# Patient Record
Sex: MALE | Race: WHITE | Employment: FULL TIME | URBAN - METROPOLITAN AREA
[De-identification: names, ages, dates, MRNs, and addresses within clinical notes are randomized per-mention and may not be internally consistent; named-entity substitution may affect disease eponyms.]

---

## 2022-01-12 ENCOUNTER — HOSPITAL ENCOUNTER (OUTPATIENT)
Dept: PHYSICAL THERAPY | Age: 32
Discharge: HOME | End: 2022-01-12
Payer: COMMERCIAL

## 2022-01-12 PROCEDURE — 97161 PT EVAL LOW COMPLEX 20 MIN: CPT

## 2022-01-12 PROCEDURE — 97112 NEUROMUSCULAR REEDUCATION: CPT

## 2022-01-12 PROCEDURE — 97110 THERAPEUTIC EXERCISES: CPT

## 2022-01-12 PROCEDURE — 97530 THERAPEUTIC ACTIVITIES: CPT

## 2022-01-12 NOTE — PROGRESS NOTES
[facility]              [address]        [place], [address]   [phone] fax    Plan of Care/ Statement of Necessity for Physical Therapy Services  Patient name: Sabine Chanel Start of Care: 2022   Referral source: Grady Perdue,* : 1990    Medical Diagnosis: Left knee pain [M25.562]  Payor: Nahun Villafana / [place]: [place] [employer] / Product Type: PPO /  Onset Date:  DoS 2021  DoI 2021    Treatment Diagnosis: Left Knee Pain, s/p ACLr   Prior Hospitalization: see medical history Provider#: [id number]   Medications: Verified on Patient summary List    Comorbidities: Left Knee ACL Reconstruction x2 (, 2021)   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 Assessment:    Patient presents s/p left knee ACL reconstruction revision via BTB allograft, lateral meniscus repair and chondroplasty of the lateral femoral condyle (DoS 2021) secondary to sports-related injury 2021. PMH significant for prior ACL reconstruction ~. Patient demonstrates a normal neurovascular screen with appropriate healing visualized upon inspection of surgical incisions. Patient noted to demonstrate loss of terminal knee extension AROM, AROM 0-5-90 degrees, with poor recruitment of the quadriceps. Review of exercise performance and positional stretching to aid patient in post-surgical return of extension. Reviewed with patient post-surgical protocol as well as post-surgical ROM and weightbearing precautions.  To progress patient in accordance with post-surgical protocol and MD discretion to enable patient to return back to PLOF.      Patient will continue to benefit from skilled PT services to modify and progress therapeutic interventions, address functional mobility deficits, address ROM deficits, address strength deficits, analyze and address soft tissue restrictions, analyze and cue movement patterns, analyze and modify body mechanics/ergonomics, assess and modify postural abnormalities, address imbalance/dizziness and instruct in home and community integration to attain remaining goals. Key Information:    Gait:    NWB with bilateral axillary crutches  Functional Tests: Deferred secondary to weightbearing precautions     Surgical Incision: Taping in place; No evidence of drainage without evidence of infection  Neurovascular Screen: Intact gross sensation to light touch, 2+ Posterior tibialis pulse, 2+ Dorsalis pedis pulse, Non-tender, supple calf upon palpation     ROM / Strength []?  Unable to assess                                                                                                                                                                          AROM                     MMT (1-5)      Left Right Left Right   Knee Flexion 90 156 5 5     Extension (0)5 3(0) 2+ 5   *Assessed in supine     Poor/fair quad contraction with supine quad set     Palpation:              Warmth: Slight increase in warmth to knee              Edema: 1+ pitting edema to tibial tuberosity, Non-pitting edema to anterior knee     Girth Measurements:     Joint line   Left 40 cm   Right  37 cm      Evaluation Complexity History MEDIUM  Complexity : 1-2 comorbidities / personal factors will impact the outcome/ POC ; Examination MEDIUM Complexity : 3 Standardized tests and measures addressing body structure, function, activity limitation and / or participation in recreation  ;Presentation LOW Complexity : Stable, uncomplicated  ;Clinical Decision Making HIGH Complexity : FOTO score of 1- 25   Overall Complexity Rating: LOW   Problem List: pain affecting function, decrease ROM, decrease strength, edema affecting function, impaired gait/ balance, decrease ADL/ functional abilitiies, decrease activity tolerance, decrease flexibility/ joint mobility and decrease transfer abilities   Treatment Plan may include any combination of the following: Therapeutic exercise, Therapeutic activities, Neuromuscular re-education, Physical agent/modality, Gait/balance training, Manual therapy, Patient education, Self Care training, Functional mobility training, Home safety training and Stair training  Patient / Family readiness to learn indicated by: asking questions, trying to perform skills and interest  Persons(s) to be included in education: patient (P)  Barriers to Learning/Limitations: None  Patient Goal (s): Full range of motion, Weightbearing and walking, Regain confidence in knee  Patient Self Reported Health Status: excellent  Rehabilitation Potential: good    Short Term Goals: To be accomplished in 3 weeks:  1. Patient will subjectively report full compliance with prescribed HEP. Eval: HEP provided  2. Patient will demonstrate left knee AROM 0-90 degrees to enable patient to progress successfully with post-surgical protocol. Eval: Left Knee AROM = 0 - 5 - 90 degrees  3. Patient will demonstrate left supine SLR x15 repetitions with maintenance of terminal knee extension without quadriceps lag (no brace) to allow for successful transition to WBAT when released. Eval: Supine Quad Set = Fair quad contraction    Long Term Goals: To be accomplished in 6 weeks:  1. Patient will demonstrate a significant functional improvement as demonstrated by a score of >/= 65 on FOTO. Eval: FOTO = 24       2. Patient will demonstrate left SLS >/= 30 seconds to improve safety with community ambulation. Eval: NT per weightbearing precautions  3. Patient will demonstrate ability to perform sit-to-stand from chair with symmetrical weightbearing bilaterally and without pain to improve ease with functional transfers. Eval: NT per weightbearing precautions     Frequency / Duration: Patient to be seen 2 times per week for 6 weeks.     Patient/ Caregiver education and instruction: Diagnosis, prognosis, self care, activity modification, brace/ splint application and exercises   [x]  Plan of care has been reviewed with CLAUDINE Prieto, PT 1/12/2022 8:37 AM  ________________________________________________________________________    I certify that the above Therapy Services are being furnished while the patient is under my care. I agree with the treatment plan and certify that this therapy is necessary.     Physician's Signature:____________Date:_________TIME:________     Chelsy Fernando  ** Signature, Date and Time must be completed for valid certification **  Please sign and return to [facility] [address]              [address] [facility]        [place], [address]   [phone] fax

## 2022-01-12 NOTE — PROGRESS NOTES
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, [employer], Recreational Sports, Work [employer]  Current Functional Status: NWB with use of bilateral crutches  Work Hx: [address] (currently working from home)  Living Situation: Lives in [facility] (bedroom on second story - currently sleeping on couch)  Comorbidities: Left Knee ACL Reconstruction x2 (, 2021)  Medications: Tylenol, Oxycodone    Subjective: Patient presents s/p revision of left ACL reconstruction with one-third mid-patellar bone-tendon-bone allograft with concomitant completion of lateral meniscus tear repair and chrondroplasty of the lateral femoral condyle (DoS 2021). Surgery secondary to patient undergoing injury 2021 while playing [email or web address]. Patient with history of initial left ACL reconstruction in . Per last MD note 1/3/2022 patient to be touch-down weightbearing for 4 weeks post-surgically with ROM 0-90 degrees for 6 weeks post-surgery. Patient to be progressed to full weightbearing and weaned off of crutches 6-8 weeks post-surgery. Patient presents with hinge brace unlocked 0-90 degrees. Patient reports working on gentle quad strengthening (I.e. quad set, SLR). Patient reports maintenance of weightbearing precautions. Patient denies falls. Patient reports deep anterior knee pain.  Patient reports noting edema to the anterior superior lower leg but denies pain which radiates into the thigh nor lower leg. Patient reports isolated N/T to lateral knee. Patient denies the following: s/s infection, s/s DVT/PE. Pain Intensity (0-10, VAS): Current 4, Worst 10, Best 3    Patient Goals: \"Full range of motion, Weightbearing and walking, Regain confidence in knee\"     OBJECTIVE EXAMINATION    Gait: NWB with bilateral axillary crutches    Functional Tests: Deferred secondary to weightbearing precautions    Surgical Incision: Taping in place;  No evidence of drainage without evidence of infection  Neurovascular Screen: Intact gross sensation to light touch, 2+ Posterior tibialis pulse, 2+ Dorsalis pedis pulse, Non-tender, supple calf upon palpation    ROM / Strength [] Unable to assess                                                                               AROM                     MMT (1-5)    Left Right Left Right   Hip Flexion   5 5    ER   NT 5    IR   NT 5   Knee Flexion 90 156 5 5    Extension (0)5 3(0) 2+ 5   Ankle Dorsiflexion   5 5   *Assessed in supine    Fair quad contraction with supine quad set    Flexibility: [] Unable to assess at this time  Hamstrings (Supine 90-90  Inclinometer):    (L) Tightness=  Moderate    Palpation:   Warmth: Slight increase in warmth to knee   Edema: 1+ pitting edema to tibial tuberosity, Non-pitting edema to anterior knee       Girth Measurements:    Joint line   Left 40 cm   Right  37 cm       OBJECTIVE    19 min [x]Eval                  []Re-Eval     8 min Therapeutic Exercise:  [x] See flow sheet : Patient educated regarding completion of prescribed HEP and provided with written HEP instructions, Patient educated regarding diagnosis and PT POC   Rationale: increase ROM and increase strength to improve the patients ability to improve ease with functional ADLs    10 min Therapeutic Activity:   See flow sheet : Review regarding donning/doffing of brace and proper positioning, Review regarding fall prevention, Education regarding post-operative protocol and ROM and WB precautions, Education regarding infection/DVT/PE prevention   Rationale: increase ROM, increase strength and increase proprioception  to improve the patients ability to improve post-surgical results     8 min Neuromuscular Re-education:  [x]  See flow sheet : Emphasis placed on improving recruitment and activation of the quadriceps with utilization of visual and tactile cuing - Performance in supine and supine longsit   Rationale: increase ROM, increase strength, improve balance and increase proprioception  to improve the patients ability to improve ease with return to PLOF          With   [] TE   [] TA   [] neuro   [] other: Patient Education: [x] Review HEP    [] Progressed/Changed HEP based on:   [] positioning   [] body mechanics   [] transfers   [] heat/ice application    [] other:      Other Objective/Functional Measures: See objective above. Pain Level (0-10 scale) post treatment: 4    ASSESSMENT/Changes in Function: Patient presents s/p left knee ACL reconstruction revision via BTB allograft, lateral meniscus repair and chondroplasty of the lateral femoral condyle (DoS 12/29/2021) secondary to sports-related injury 11/25/2021. PMH significant for prior ACL reconstruction ~2005. Patient demonstrates a normal neurovascular screen with appropriate healing visualized upon inspection of surgical incisions. Patient noted to demonstrate loss of terminal knee extension AROM, AROM 0-5-90 degrees, with poor recruitment of the quadriceps. Review of exercise performance and positional stretching to aid patient in post-surgical return of extension. Reviewed with patient post-surgical protocol as well as post-surgical ROM and weightbearing precautions. To progress patient in accordance with post-surgical protocol and MD discretion to enable patient to return back to PLOF.      Patient will continue to benefit from skilled PT services to modify and progress therapeutic interventions, address functional mobility deficits, address ROM deficits, address strength deficits, analyze and address soft tissue restrictions, analyze and cue movement patterns, analyze and modify body mechanics/ergonomics, assess and modify postural abnormalities, address imbalance/dizziness and instruct in home and community integration to attain remaining goals. [x]  See Plan of Care  []  See progress note/recertification  []  See Discharge Summary         Progress towards goals / Updated goals:    Short Term Goals: To be accomplished in 3 weeks:  1. Patient will subjectively report full compliance with prescribed HEP. Eval: HEP provided  2. Patient will demonstrate left knee AROM 0-90 degrees to enable patient to progress successfully with post-surgical protocol. Eval: Left Knee AROM = 0 - 5 - 90 degrees  3. Patient will demonstrate left supine SLR x15 repetitions with maintenance of terminal knee extension without quadriceps lag (no brace) to allow for successful transition to WBAT when released. Eval: Supine Quad Set = Fair quad contraction    Long Term Goals: To be accomplished in 6 weeks:  1. Patient will demonstrate a significant functional improvement as demonstrated by a score of >/= 65 on FOTO. Eval: FOTO = 24       2. Patient will demonstrate left SLS >/= 30 seconds to improve safety with community ambulation. Eval: NT per weightbearing precautions  3. Patient will demonstrate ability to perform sit-to-stand from chair with symmetrical weightbearing bilaterally and without pain to improve ease with functional transfers.   Eval: NT per weightbearing precautions       PLAN  [x]  Upgrade activities as tolerated     []  Continue plan of care  []  Update interventions per flow sheet       []  Discharge due to:_  []  Other:_      Jessika Owens PT 1/12/2022  8:32 AM    Future Appointments   Date Time Provider Sofía Rodriguez   1/12/2022  2:45 PM Kimberly Baca, PT [facility]

## 2022-01-17 ENCOUNTER — HOSPITAL ENCOUNTER (OUTPATIENT)
Dept: PHYSICAL THERAPY | Age: 32
Discharge: HOME | End: 2022-01-17
Payer: COMMERCIAL

## 2022-01-17 PROCEDURE — 97112 NEUROMUSCULAR REEDUCATION: CPT

## 2022-01-17 PROCEDURE — 97014 ELECTRIC STIMULATION THERAPY: CPT

## 2022-01-17 PROCEDURE — 97110 THERAPEUTIC EXERCISES: CPT

## 2022-01-17 PROCEDURE — 97140 MANUAL THERAPY 1/> REGIONS: CPT

## 2022-01-17 NOTE — PROGRESS NOTES
PT DAILY TREATMENT NOTE     Patient Name: Nithya Archuleta  Date:2022  : 1990  [x]  Patient  Verified  Payor: Wallace Araiza / [place]: [place] [employer] PPO / Product Type: PPO /    In time:9:31  Out time:10:23  Total Treatment Time (min): 52  Visit #: 2 of 12      Treatment Area: Left knee pain [M25.562]    SUBJECTIVE  Pain Level (0-10 scale): 2  Any medication changes, allergies to medications, adverse drug reactions, diagnosis change, or new procedure performed?: [x] No    [] Yes (see summary sheet for update)  Subjective functional status/changes:   [] No changes reported  Patient states he feels his knee is getting better every day. OBJECTIVE    Modality rationale: decrease pain to improve the patients ability to decrease difficulty while performing tasks. Min Type Additional Details   10 [x] Estim:  []Unatt       []IFC  []Premod                        [x]Other: VMS 10:10                               (with quad set) [x]w/ice   []w/heat  Position:supine  Location:quads/knee   [] Skin assessment post-treatment:  []intact []redness- no adverse reaction    []redness  adverse reaction:       20 min Therapeutic Exercise:  [x] See flow sheet :   Rationale: increase ROM, increase strength, improve coordination and increase proprioception to improve the patients ability to increase ease with ADLs. 10 min Neuromuscular Re-education:  [x]  See flow sheet :emaphsis on quad set. Rationale: increase ROM, increase strength, improve coordination and increase proprioception  to improve the patients ability to increase ease with ambulation. 12 min Manual Therapy:    Supine- patellar glide (superior/lateral/inferior)  Supine- manual hamstring stretch. The manual therapy interventions were performed at a separate and distinct time from the therapeutic activities interventions.   Rationale: decrease pain, increase ROM, increase tissue extensibility and decrease trigger points to increase ease with transfers. With   [] TE   [] TA   [] neuro   [] other: Patient Education: [x] Review HEP    [] Progressed/Changed HEP based on:   [] positioning   [] body mechanics   [] transfers   [] heat/ice application    [] other:      Other Objective/Functional Measures: pt reports performing HEP. Left knee AROM = 0-5-90 deg    Pain Level (0-10 scale) post treatment: 5    ASSESSMENT/Changes in Function: Initiated exercises per POC. Patient reports being compliant with weightbearing precautions. Patient will continue to benefit from skilled PT services to modify and progress therapeutic interventions, address functional mobility deficits, address ROM deficits, address strength deficits and analyze and address soft tissue restrictions to attain remaining goals. []  See Plan of Care  []  See progress note/recertification  []  See Discharge Summary         Progress towards goals / Updated goals:  Short Term Goals: To be accomplished in 3 weeks:  1. Patient will subjectively report full compliance with prescribed HEP. Eval: HEP provided  Current: MET: pt reports performing HEP. 1/17/22  2. Patient will demonstrate left knee AROM 0-90 degrees to enable patient to progress successfully with post-surgical protocol. Eval: Left Knee AROM = 0 - 5 - 90 degrees  Current: Remains: Left knee AROM = 0-5-90 deg. 1/17/22  3. Patient will demonstrate left supine SLR x15 repetitions with maintenance of terminal knee extension without quadriceps lag (no brace) to allow for successful transition to WBAT when released. Eval: Supine Quad Set = Fair quad contraction     Long Term Goals: To be accomplished in 6 weeks:  1. Patient will demonstrate a significant functional improvement as demonstrated by a score of >/= 65 on FOTO. Eval: FOTO = 24       2. Patient will demonstrate left SLS >/= 30 seconds to improve safety with community ambulation. Eval: NT per weightbearing precautions  3.  Patient will demonstrate ability to perform sit-to-stand from chair with symmetrical weightbearing bilaterally and without pain to improve ease with functional transfers.   Eval: NT per weightbearing precautions        PLAN  []  Upgrade activities as tolerated     [x]  Continue plan of care  []  Update interventions per flow sheet       []  Discharge due to:_  []  Other:_      aCssius Lambert, PTA 1/17/2022  9:32 AM    Future Appointments   Date Time Provider Sofía Rodriguez   1/21/2022  1:15 PM Albina Guillen [facility]   1/24/2022  1:15 PM Alondra Peña [facility]   1/28/2022  1[facility]   2/1/2022 11:45 AM Alondra Peña, PT [facility]   2/3/2022 11:00 AM Renato Millan [facility]   2/8/2022 11:45 AM Alondra Peña, PT [facility]   2/10/2022 11:00 AM Bikkers, Randee Duverney, PT [facility]

## 2022-01-24 ENCOUNTER — HOSPITAL ENCOUNTER (OUTPATIENT)
Dept: PHYSICAL THERAPY | Age: 32
Discharge: HOME | End: 2022-01-24
Payer: COMMERCIAL

## 2022-01-24 PROCEDURE — 97112 NEUROMUSCULAR REEDUCATION: CPT

## 2022-01-24 PROCEDURE — 97140 MANUAL THERAPY 1/> REGIONS: CPT

## 2022-01-24 PROCEDURE — 97110 THERAPEUTIC EXERCISES: CPT

## 2022-01-24 NOTE — PROGRESS NOTES
PT DAILY TREATMENT NOTE     Patient Name: Jorge Zapata  Date:2022  : 1990  [x]  Patient  Verified  Payor: Dejon Ortez / [place]: [place] [employer] [place] / Product Type: PPO /    In time:117  Out time:158  Total Treatment Time (min): 41  Visit #: 3 of 12    Treatment Area: Left knee pain [M25.562]    SUBJECTIVE  Pain Level (0-10 scale): 0  Any medication changes, allergies to medications, adverse drug reactions, diagnosis change, or new procedure performed?: [x] No    [] Yes (see summary sheet for update)  Subjective functional status/changes:   [] No changes reported  Patient reports noting some discomfort below his knee cap when extending his knee. Patient reports removal of stitches last week without noting any s/s of infection. OBJECTIVE    21 min Therapeutic Exercise:  [x] See flow sheet : Emphasis placed on improving available left knee AROM and LE strength   Rationale: increase ROM and increase strength to improve the patients ability to improve ease with household ADLs    10 min Neuromuscular Re-education:  [x]  See flow sheet : Emphasis placed on improving activation and recruitment of the quadriceps and gluteal musculature and improving LE proprioceptive and kinesthetic awareness   Rationale: increase ROM, increase strength, improve balance and increase proprioception  to improve the patients ability to improve ease with transition to ambulation    10 min Manual Therapy:    Supine, Left Tibial Distraction (OPP)  Supine, Left Hamstring 90/90 Manual Stretch  Supine, Left Knee Extension with Manual Resistance   The manual therapy interventions were performed at a separate and distinct time from the therapeutic activities interventions. Rationale: decrease pain, increase ROM and increase tissue extensibility to improve ease with stair management.      With   [] TE   [] TA   [] neuro   [] other: Patient Education: [x] Review HEP    [] Progressed/Changed HEP based on:   [] positioning   [] body mechanics [] transfers   [] heat/ice application    [] other:      Other Objective/Functional Measures:   Left Knee AROM = 0 - 3 - 90 deg (pre-manual)     *EStim not completed per patient request secondary to lack of appropriate clothing. Pain Level (0-10 scale) post treatment: 0    ASSESSMENT/Changes in Function: With ability to achieve terminal knee extension AROM post-manual but with continued prominent hypertonicity of the hamstring musculature with therapist questioning presence of development of patellar tendinopathy with patient educated to ice region to aid in pain and swelling management. With plan to initiate partial weightbearing with use of bilateral crutches per MD discretion. Patient will continue to benefit from skilled PT services to modify and progress therapeutic interventions, address functional mobility deficits, address ROM deficits, address strength deficits, analyze and address soft tissue restrictions, analyze and cue movement patterns, analyze and modify body mechanics/ergonomics, assess and modify postural abnormalities, address imbalance/dizziness and instruct in home and community integration to attain remaining goals. []  See Plan of Care  []  See progress note/recertification  []  See Discharge Summary         Progress towards goals / Updated goals:    Short Term Goals: To be accomplished in 3 weeks:  1. Patient will subjectively report full compliance with prescribed HEP. Eval: HEP provided  Current: Met, Patient reports performing HEP. 1/17/22  2. Patient will demonstrate left knee AROM 0-90 degrees to enable patient to progress successfully with post-surgical protocol. Eval: Left Knee AROM = 0 - 5 - 90 degrees  Current: Remains, Left Knee AROM = 0 - 3 - 90 deg, 1/24/2022  3. Patient will demonstrate left supine SLR x15 repetitions with maintenance of terminal knee extension without quadriceps lag (no brace) to allow for successful transition to WBAT when released.   Eval: Supine Quad Set = Fair quad contraction     Long Term Goals: To be accomplished in 6 weeks:  1. Patient will demonstrate a significant functional improvement as demonstrated by a score of >/= 65 on FOTO. Eval: FOTO = 24       2. Patient will demonstrate left SLS >/= 30 seconds to improve safety with community ambulation. Eval: NT per weightbearing precautions  3. Patient will demonstrate ability to perform sit-to-stand from chair with symmetrical weightbearing bilaterally and without pain to improve ease with functional transfers.   Eval: NT per weightbearing precautions     PLAN  [x]  Upgrade activities as tolerated     [x]  Continue plan of care  []  Update interventions per flow sheet       []  Discharge due to:_  []  Other:_      Dash Treviño PT 1/24/2022  10:28 AM    Future Appointments   Date Time Provider Sofía Rodriguez   1/24/2022  1:15 PM Bonifacio Mccormick [place] [facility]   1/28/2022  1:15 PM [facility] [place] [facility]   2/1/2022 11:45 AM Bonifacio Mccormick PT [facility]   [facility]2022 [facility] [place] [facility]   2/8/2022 11:45 AM Bonifacio Mccormick PT [facility]   2/10/2022 11:00 AM Nanette Guillen PT [facility]

## 2022-01-28 ENCOUNTER — HOSPITAL ENCOUNTER (OUTPATIENT)
Dept: PHYSICAL THERAPY | Age: 32
Discharge: HOME | End: 2022-01-28
Payer: COMMERCIAL

## 2022-01-28 PROCEDURE — 97140 MANUAL THERAPY 1/> REGIONS: CPT

## 2022-01-28 PROCEDURE — 97014 ELECTRIC STIMULATION THERAPY: CPT

## 2022-01-28 PROCEDURE — 97112 NEUROMUSCULAR REEDUCATION: CPT

## 2022-01-28 PROCEDURE — 97110 THERAPEUTIC EXERCISES: CPT

## 2022-01-28 NOTE — PROGRESS NOTES
PT DAILY TREATMENT NOTE     Patient Name: Francisco Schneider  Date:2022  : 1990  [x]  Patient  Verified  Payor: Clemente Atkins / [place]: [place] [employer] [place] / Product Type: PPO /    In time:117  Out time:214  Total Treatment Time (min): 62  Visit #: 4 of 12    Treatment Area: Left knee pain [M25.562]    SUBJECTIVE  Pain Level (0-10 scale): 0  Any medication changes, allergies to medications, adverse drug reactions, diagnosis change, or new procedure performed?: [x] No    [] Yes (see summary sheet for update)  Subjective functional status/changes:   [] No changes reported  Patient reports continued non-weightbearing through left LE. Patient reports noting some AM discomfort upon waking. Patient reports relief of swelling with use of compression garment to left knee. OBJECTIVE    Modality rationale: decrease pain and increase muscle contraction/control to improve the patients ability to improve ease with return to ambulation   Min Type Additional Details   10 X Estim:  -Unatt       -IFC  -Premod                        -Other: VMS 4 pads co-contract Position: Supine  Location: Quadriceps     19 min Therapeutic Exercise:  [x]? See flow sheet : Emphasis placed on improving available left knee AROM and LE strength   Rationale: increase ROM and increase strength to improve the patients ability to improve ease with household ADLs     20 min Neuromuscular Re-education:  [x]?   See flow sheet : Emphasis placed on improving activation and recruitment of the quadriceps and gluteal musculature and improving LE proprioceptive and kinesthetic awareness   Rationale: increase ROM, increase strength, improve balance and increase proprioception  to improve the patients ability to improve ease with transition to ambulation     8 min Manual Therapy:    Supine, Left Tibial Distraction (OPP)  Supine, Left Hamstring 90/90 Manual Stretch   The manual therapy interventions were performed at a separate and distinct time from the therapeutic activities interventions. Rationale: decrease pain, increase ROM and increase tissue extensibility to improve ease with stair management. With   [] TE   [] TA   [] neuro   [] other: Patient Education: [x] Review HEP    [] Progressed/Changed HEP based on:   [] positioning   [] body mechanics   [] transfers   [] heat/ice application    [] other:      Other Objective/Functional Measures:   Left Knee AROM = 0 - 90 deg     Pain Level (0-10 scale) post treatment: 0    ASSESSMENT/Changes in Function: In accordance with MD discretion initiated 25% weightbearing through left LE with continued use of bilateral axillary crutches. Patient with ability to achieve terminal knee extension pre-manual with patient with ability to perform supine SLR, without brace, x10 repetitions without quadriceps lag. Patient will continue to benefit from skilled PT services to modify and progress therapeutic interventions, address functional mobility deficits, address ROM deficits, address strength deficits, analyze and address soft tissue restrictions, analyze and cue movement patterns, analyze and modify body mechanics/ergonomics, assess and modify postural abnormalities, address imbalance/dizziness and instruct in home and community integration to attain remaining goals. []  See Plan of Care  []  See progress note/recertification  []  See Discharge Summary         Progress towards goals / Updated goals:    Short Term Goals: To be accomplished in 3 weeks:  1. Patient will subjectively report full compliance with prescribed HEP. Eval: HEP provided  Current: Met, Patient reports performing HEP. 1/17/22  2. Patient will demonstrate left knee AROM 0-90 degrees to enable patient to progress successfully with post-surgical protocol. Eval: Left Knee AROM = 0 - 5 - 90 degrees  Current: Progressing, Left Knee AROM = 0 - 90 deg, 1/28/2022  3.  Patient will demonstrate left supine SLR x15 repetitions with maintenance of terminal knee extension without quadriceps lag (no brace) to allow for successful transition to WBAT when released. Eval: Supine Quad Set = Fair quad contraction     Long Term Goals: To be accomplished in 6 weeks:  1. Patient will demonstrate a significant functional improvement as demonstrated by a score of >/= 65 on FOTO. Eval: FOTO = 24       2. Patient will demonstrate left SLS >/= 30 seconds to improve safety with community ambulation. Eval: NT per weightbearing precautions  3. Patient will demonstrate ability to perform sit-to-stand from chair with symmetrical weightbearing bilaterally and without pain to improve ease with functional transfers.   Eval: NT per weightbearing precautions     PLAN  [x]  Upgrade activities as tolerated     [x]  Continue plan of care  []  Update interventions per flow sheet       []  Discharge due to:_  []  Other:_      Mirella York, PT 1/28/2022  7:43 AM    Future Appointments   Date Time Provider Sofía Rodriguez   1/28/2022  1:15 PM Renato Kent [facility]   2/1/2022 11:45 AM Burgess Steiner, PT [facility]   2/3/2022 11:00 AM Renato Kent [facility]   2/8/2022 11:45 AM Burgess Steiner, PT [facility]   2/10/2022 11:00 AM Zaida Guillen, PT [facility]

## 2022-02-01 ENCOUNTER — APPOINTMENT (OUTPATIENT)
Dept: PHYSICAL THERAPY | Age: 32
End: 2022-02-01
Payer: COMMERCIAL

## 2022-02-02 ENCOUNTER — HOSPITAL ENCOUNTER (OUTPATIENT)
Dept: PHYSICAL THERAPY | Age: 32
Discharge: HOME | End: 2022-02-02
Payer: COMMERCIAL

## 2022-02-02 ENCOUNTER — TELEPHONE (OUTPATIENT)
Dept: PHYSICAL THERAPY | Age: 32
End: 2022-02-02

## 2022-02-02 PROCEDURE — 97140 MANUAL THERAPY 1/> REGIONS: CPT

## 2022-02-02 PROCEDURE — 97110 THERAPEUTIC EXERCISES: CPT

## 2022-02-02 PROCEDURE — 97112 NEUROMUSCULAR REEDUCATION: CPT

## 2022-02-02 NOTE — PROGRESS NOTES
PT DAILY TREATMENT NOTE     Patient Name: Christelle Pineda  Date:2022  : 1990  [x]  Patient  Verified  Payor: Radha Clayton / [place]: [place] [employer] [place] / Product Type: PPO /    In time:3:31  Out time:4:32  Total Treatment Time (min): 61  Visit #: 5 of 12      Treatment Area: Left knee pain [M25.562]    SUBJECTIVE  Pain Level (0-10 scale): 2  Any medication changes, allergies to medications, adverse drug reactions, diagnosis change, or new procedure performed?: [x] No    [] Yes (see summary sheet for update)  Subjective functional status/changes:   [] No changes reported  Patient reports his knee has been a little sore today. Patient states that the physician stated his knee was healing well. OBJECTIVE                 Modality rationale: decrease pain to improve the patients ability to decrease difficulty while performing tasks. Min Type Additional Details    10 [x]? Estim:  []? Unatt       []? IFC  []? Premod                        [x]? Other: VMS 10\" on, 10\" off with SLR []?w/ice   []?w/heat  Position:supine  Location:quad (VMO)    10 [x]? Ice     []?  heat  []? Ice massage  []? Laser   []? Anodyne Position:sitting  Location:knee    []? Skin assessment post-treatment:  []?intact []? redness- no adverse reaction    []? redness  adverse reaction:        23 min Therapeutic Exercise:  [x]? See flow sheet :   Rationale: increase ROM, increase strength, improve coordination and increase proprioception to improve the patients ability to increase ease with ADLs.       10 min Neuromuscular Re-education:  [x]? See flow sheet :emaphsis on quad set. Rationale: increase ROM, increase strength, improve coordination and increase proprioception  to improve the patients ability to increase ease with ambulation.      8 min Manual Therapy:    Supine- patellar glide (superior/lateral/inferior)  Supine- manual hamstring stretch.     The manual therapy interventions were performed at a separate and distinct time from the therapeutic activities interventions. Rationale: decrease pain, increase ROM, increase tissue extensibility and decrease trigger points to increase ease with transfers.            With   [] TE   [] TA   [] neuro   [] other: Patient Education: [x] Review HEP    [] Progressed/Changed HEP based on:   [] positioning   [] body mechanics   [] transfers   [] heat/ice application    [] other:      Other Objective/Functional Measures: pre manual left knee 0-90 deg. Pain Level (0-10 scale) post treatment: 2    ASSESSMENT/Changes in Function: Patient is progressing well with good quad set. Patient continues to maintain terminal knee extension. Patient will continue to benefit from skilled PT services to modify and progress therapeutic interventions, address functional mobility deficits, address ROM deficits, address strength deficits, analyze and address soft tissue restrictions and analyze and cue movement patterns to attain remaining goals. []  See Plan of Care  []  See progress note/recertification  []  See Discharge Summary         Progress towards goals / Updated goals:  Short Term Goals: To be accomplished in 3 weeks:  1. Patient will subjectively report full compliance with prescribed HEP. Eval: HEP provided  Current: Met, Patient reports performing HEP. 1/17/22  2. Patient will demonstrate left knee AROM 0-90 degrees to enable patient to progress successfully with post-surgical protocol. Eval: Left Knee AROM = 0 - 5 - 90 degrees  Current: Progressing, Left Knee AROM = 0 - 90 deg, 1/28/2022  3. Patient will demonstrate left supine SLR x15 repetitions with maintenance of terminal knee extension without quadriceps lag (no brace) to allow for successful transition to WBAT when released. Eval: Supine Quad Set = Fair quad contraction  Current: Progressing: SLR = 10 reps with minimal extensor lag. 2/2/22     Long Term Goals: To be accomplished in 6 weeks:  1.  Patient will demonstrate a significant functional improvement as demonstrated by a score of >/= 65 on FOTO. Eval: FOTO = 24       2. Patient will demonstrate left SLS >/= 30 seconds to improve safety with community ambulation. Eval: NT per weightbearing precautions  3. Patient will demonstrate ability to perform sit-to-stand from chair with symmetrical weightbearing bilaterally and without pain to improve ease with functional transfers.   Eval: NT per weightbearing precautions     PLAN  []  Upgrade activities as tolerated     [x]  Continue plan of care  []  Update interventions per flow sheet       []  Discharge due to:_  []  Other:_      Kobi North PTA 2/2/2022  3:25 PM    Future Appointments   Date Time Provider Sofía Rodriguez   2/2/2022  3:30 PM Fredrick Black PTA [facility]   2/4/2022  1:15 PM Fredrick Black PTA [facility]   2/8/2022 11:45 AM Alyse Espinosa, PT [facility]   2/10/2022 11:00 AM Jerardo Guillen, PT [facility]

## 2022-02-03 ENCOUNTER — APPOINTMENT (OUTPATIENT)
Dept: PHYSICAL THERAPY | Age: 32
End: 2022-02-03
Payer: COMMERCIAL

## 2022-02-04 ENCOUNTER — HOSPITAL ENCOUNTER (OUTPATIENT)
Dept: PHYSICAL THERAPY | Age: 32
Discharge: HOME | End: 2022-02-04
Payer: COMMERCIAL

## 2022-02-04 PROCEDURE — 97140 MANUAL THERAPY 1/> REGIONS: CPT

## 2022-02-04 PROCEDURE — 97110 THERAPEUTIC EXERCISES: CPT

## 2022-02-04 PROCEDURE — 97112 NEUROMUSCULAR REEDUCATION: CPT

## 2022-02-04 PROCEDURE — 97014 ELECTRIC STIMULATION THERAPY: CPT

## 2022-02-04 NOTE — PROGRESS NOTES
PT DAILY TREATMENT NOTE     Patient Name: Aimee Gordon  PQDJ:[id number]  : 1990  [x]  Patient  Verified  Payor: Kendrick Díaz / Plan: [place] [employer] [place] / Product Type: PPO /    In time:1:15  Out time:2:15  Total Treatment Time (min): 60  Visit #: 6 of 12      Treatment Area: Left knee pain [M25.562]    SUBJECTIVE  Pain Level (0-10 scale): 0  Any medication changes, allergies to medications, adverse drug reactions, diagnosis change, or new procedure performed?: [x] No    [] Yes (see summary sheet for update)  Subjective functional status/changes:   [] No changes reported  Patient reports he has been practicing putting 25% weight through his leg more often to prepare for full weightbearing next week. OBJECTIVE                Modality rationale: decrease pain to improve the patients ability to decrease difficulty while performing tasks.    Min Type Additional Details     10 [x]? ? Estim:  []?? Unatt       []? ?IFC  []? ?Premod                        [x]? ? Other: VMS 10\" on, 10\" off with SLR []??w/ice   []? ?w/heat  Position:supine  Location:quad (VMO)     10 [x]? ?  Ice     []? ?  heat  []? ?  Ice massage  []? ?  Laser   []? ?  Anodyne Position:sitting  Location:knee     []? ? Skin assessment post-treatment:  []??intact []? ?redness- no adverse reaction    []? ?redness  adverse reaction:         23 min Therapeutic Exercise:  [x]? ? See flow sheet :   Rationale: increase ROM, increase strength, improve coordination and increase proprioception to improve the patients ability to increase ease with ADLs.       9 min Neuromuscular Re-education:  [x]? ?  See flow sheet :emaphsis on quad set.    Rationale: increase ROM, increase strength, improve coordination and increase proprioception  to improve the patients ability to increase ease with ambulation.      8 min Manual Therapy:    Supine- patellar glide (superior/lateral/inferior)  Supine- manual hamstring stretch.    The manual therapy interventions were performed at a separate and distinct time from the therapeutic activities interventions. Rationale: decrease pain, increase ROM, increase tissue extensibility and decrease trigger points to increase ease with transfers.                  With   [] TE   [] TA   [] neuro   [] other: Patient Education: [x] Review HEP    [] Progressed/Changed HEP based on:   [] positioning   [] body mechanics   [] transfers   [] heat/ice application    [] other:      Other Objective/Functional Measures: pre manual 0-90 deg. Pain Level (0-10 scale) post treatment: 0    ASSESSMENT/Changes in Function: Patient able to maintain terminal knee extension. Patient with less patellar tendon irritation with SAQ. Patient will continue to benefit from skilled PT services to modify and progress therapeutic interventions, address functional mobility deficits, address ROM deficits, address strength deficits, analyze and address soft tissue restrictions, analyze and cue movement patterns and analyze and modify body mechanics/ergonomics to attain remaining goals. []  See Plan of Care  []  See progress note/recertification  []  See Discharge Summary         Progress towards goals / Updated goals:  Short Term Goals: To be accomplished in 3 weeks:  1. Patient will subjectively report full compliance with prescribed HEP. Eval: HEP provided  Current: Met, Patient reports performing HEP. 1/17/22  2. Patient will demonstrate left knee AROM 0-90 degrees to enable patient to progress successfully with post-surgical protocol. Eval: Left Knee AROM = 0 - 5 - 90 degrees  Current: Progressing, Left Knee AROM = 0 - 90 deg, 1/28/2022  3. Patient will demonstrate left supine SLR x15 repetitions with maintenance of terminal knee extension without quadriceps lag (no brace) to allow for successful transition to WBAT when released. Eval: Supine Quad Set = Fair quad contraction  Current: Progressing: SLR = 10 reps with minimal extensor lag.  2/2/22     Long Term Goals: To be accomplished in 6 weeks:  1. Patient will demonstrate a significant functional improvement as demonstrated by a score of >/= 65 on FOTO. Eval: FOTO = 24       2. Patient will demonstrate left SLS >/= 30 seconds to improve safety with community ambulation. Eval: NT per weightbearing precautions  3. Patient will demonstrate ability to perform sit-to-stand from chair with symmetrical weightbearing bilaterally and without pain to improve ease with functional transfers.   Eval: NT per weightbearing precautions     PLAN  []  Upgrade activities as tolerated     [x]  Continue plan of care  []  Update interventions per flow sheet       []  Discharge due to:_  []  Other:_      Heath Yoo, CLAUDINE 2/4/2022  9:57 AM    Future Appointments   Date Time Provider Sofía Rodriguez   2/4/2022  1:15 PM Keaton Stephenson, PTA [facility]   2/8/2022 11:45 AM Maximiliano Nettles, PT [facility]   2/10/2022 11:00 AM Renato Montanez [facility]   2/11/2022  1:15 PM Keaton Stephenson, CLAUDINE [facility]   2/14/2022  2:45 PM Costjimmie Nettles, PT [facility]   2/21/2022  2:00 PM Reginald Acevedo [facility]   2/28/2022  1:15 PM Costjimmie Nettles, PT [facility]   3/7/2022  1:15 PM Costella Gather, PT [facility]   3/14/2022  1:15 PM Octavia Guillen, PT [facility]

## 2022-02-08 ENCOUNTER — HOSPITAL ENCOUNTER (OUTPATIENT)
Dept: PHYSICAL THERAPY | Age: 32
Discharge: HOME | End: 2022-02-08
Payer: COMMERCIAL

## 2022-02-08 PROCEDURE — 97140 MANUAL THERAPY 1/> REGIONS: CPT

## 2022-02-08 PROCEDURE — 97112 NEUROMUSCULAR REEDUCATION: CPT

## 2022-02-08 PROCEDURE — 97110 THERAPEUTIC EXERCISES: CPT

## 2022-02-08 PROCEDURE — 97116 GAIT TRAINING THERAPY: CPT

## 2022-02-08 PROCEDURE — 97014 ELECTRIC STIMULATION THERAPY: CPT

## 2022-02-08 NOTE — PROGRESS NOTES
PT DAILY TREATMENT NOTE     Patient Name: Clara Perez  Date:2022  : 1990  [x]  Patient  Verified  Payor: Bibi Erp / [place]: [place] [employer] / Product Type: PPO /    In time:1145  Out time:1245  Total Treatment Time (min): 60  Visit #: 7 of 12    Treatment Area: Left knee pain [M25.562]    SUBJECTIVE  Pain Level (0-10 scale): 1  Any medication changes, allergies to medications, adverse drug reactions, diagnosis change, or new procedure performed?: [x] No    [] Yes (see summary sheet for update)  Subjective functional status/changes:   [] No changes reported  Patient reports slight improvement in infrapatellar pain with patient currently weightbearing 25-50% through left LE with use of bilateral axillary crutches. OBJECTIVE    Modality rationale: decrease pain to improve the patients ability to decrease difficulty while performing tasks.    Min Type Additional Details     10 [x]? ?? Estim:  []? ??Unatt       []? ??IFC  []? ? ?Premod                        [x]? ? ? Other: VMS 10\" on, 5\" off with SLR []???w/ice   []? ??w/heat  Position:Supine  Location:Quadriceps, 4 pads co-contract     5 []? ??  Ice     []? ??  heat  [x]? ??  Ice massage Position:sitting  Location: Left Knee       14 min Therapeutic Exercise:  [x]? ? See flow sheet : Emphasis placed on improving available left knee AROM and LE strength   Rationale: increase ROM and increase strength to improve the patients ability to improve ease with household ADLs     15 min Neuromuscular Re-education:  [x]? ?  See flow sheet : Emphasis placed on improving activation and recruitment of the quadriceps and gluteal musculature and improving LE proprioceptive and kinesthetic awareness   Rationale: increase ROM, increase strength, improve balance and increase proprioception  to improve the patients ability to improve ease with transition to ambulation     8 min Manual Therapy:    Supine, Left Tibial Distraction (OPP)  Supine, Left Hamstring 90/90 Manual Stretch The manual therapy interventions were performed at a separate and distinct time from the therapeutic activities interventions. Rationale: decrease pain, increase ROM and increase tissue extensibility to improve ease with stair management. 8 min Gait Training:  Adjustment of axillary crutch height and handle height with progression to use of single axillary crutch  1. Gait Training with Single Axillary Crutch x100' - Emphasis placed on promotion of a heel-toe gait pattern with symmetrical trunk posture without contralateral weightshift during left stance phase of gait   Rationale:Performance to aid patient in return to PLOF          With   [] TE   [] TA   [] neuro   [] other: Patient Education: [x] Review HEP    [] Progressed/Changed HEP based on:   [] positioning   [] body mechanics   [] transfers   [] heat/ice application    [] other:      Other Objective/Functional Measures:   Supine SLR x15 = Ability to perform without brace without quadriceps lag     Pain Level (0-10 scale) post treatment: 0    ASSESSMENT/Changes in Function: Opened up hinged knee brace fully with patient demonstrating ability to perform supine SLR x15 without quadriceps lag. With progression to performance of gait training with single axillary crutch with performance of gait training education within clinic and patient education to initiate at home with continued use of hinged knee brace. Patient will continue to benefit from skilled PT services to modify and progress therapeutic interventions, address functional mobility deficits, address ROM deficits, address strength deficits, analyze and address soft tissue restrictions, analyze and cue movement patterns, analyze and modify body mechanics/ergonomics, assess and modify postural abnormalities, address imbalance/dizziness and instruct in home and community integration to attain remaining goals.      []  See Plan of Care  []  See progress note/recertification  []  See Discharge Summary         Progress towards goals / Updated goals:    Short Term Goals: To be accomplished in 3 weeks:  1. Patient will subjectively report full compliance with prescribed HEP. Eval: HEP provided  Current: Met, Patient reports performing HEP. 1/17/22  2. Patient will demonstrate left knee AROM 0-140 degrees to enable patient to progress successfully with post-surgical protocol. Eval: Left Knee AROM = 0 - 5 - 90 degrees  Current: Progressing, Left Knee AROM = 0 - 90 deg, 1/28/2022  3. Patient will demonstrate left supine SLR x15 repetitions with maintenance of terminal knee extension without quadriceps lag (no brace) to allow for successful transition to WBAT when released. Eval: Supine Quad Set = Fair quad contraction  Current: Met, Supine SLR x15 = Ability to perform without brace without quadriceps lag, 2/8/2022     Long Term Goals: To be accomplished in 6 weeks:  1. Patient will demonstrate a significant functional improvement as demonstrated by a score of >/= 65 on FOTO. Eval: FOTO = 24       2. Patient will demonstrate left SLS >/= 30 seconds to improve safety with community ambulation. Eval: NT per weightbearing precautions  3. Patient will demonstrate ability to perform sit-to-stand from chair with symmetrical weightbearing bilaterally and without pain to improve ease with functional transfers.   Eval: NT per weightbearing precautions     PLAN  [x]  Upgrade activities as tolerated     [x]  Continue plan of care  []  Update interventions per flow sheet       []  Discharge due to:_  []  Other:_      Monika Palacios PT 2/8/2022  7:05 AM    Future Appointments   Date Time Provider Sofía Rodriguez   2/8/2022 11:45 AM Angela Rascon, PT [facility]   2/10/2022 11:00 AM Palmira Guillen0 N Kahlil Jeronimo [facility]   2/11/2022  1:15 PM Ether Evita, PTA [facility]   2/14/2022  2:45 PM Angela Rascon PT [facility]   2/21/2022  2:00 PM Ether Evita, PTA [facility]   2/28/2022  1:15 PM Mindy Glendy Del Toro, [address] [facility]   3/7/2022  1:15 PM Tito Cole, PT [facility]   3/14/2022  1:15 PM Tito Cole, PT [facility]

## 2022-02-10 ENCOUNTER — APPOINTMENT (OUTPATIENT)
Dept: PHYSICAL THERAPY | Age: 32
End: 2022-02-10
Payer: COMMERCIAL

## 2022-02-11 ENCOUNTER — HOSPITAL ENCOUNTER (OUTPATIENT)
Dept: PHYSICAL THERAPY | Age: 32
Discharge: HOME | End: 2022-02-11
Payer: COMMERCIAL

## 2022-02-11 PROCEDURE — 97110 THERAPEUTIC EXERCISES: CPT

## 2022-02-11 PROCEDURE — 97140 MANUAL THERAPY 1/> REGIONS: CPT

## 2022-02-11 PROCEDURE — 97112 NEUROMUSCULAR REEDUCATION: CPT

## 2022-02-11 PROCEDURE — 97014 ELECTRIC STIMULATION THERAPY: CPT

## 2022-02-11 NOTE — PROGRESS NOTES
PT DAILY TREATMENT NOTE     Patient Name: Rula Santana  Date:2022  : 1990  [x]  Patient  Verified  Payor: Ursula Cranker / [place]: [place] [employer] [place] / Product Type: PPO /    In time:115  Out time:211  Total Treatment Time (min): 64  Visit #: 8 of 12    Treatment Area: Left knee pain [M25.562]    SUBJECTIVE  Pain Level (0-10 scale): 1-2  Any medication changes, allergies to medications, adverse drug reactions, diagnosis change, or new procedure performed?: [x] No    [] Yes (see summary sheet for update)  Subjective functional status/changes:   [] No changes reported  Patient reports with ambulation at home use of hinged brace without crutches with use of single/bilateral axillary crutch when in community. OBJECTIVE    Modality rationale: decrease pain to improve the patients ability to decrease difficulty while performing tasks.    Min Type Additional Details     10 [x]???? Estim:  []????Unatt       []????IFC  []????Premod                        [x]????Other: VMS 10\" on, 5\" off with SLR []????w/ice   []? ???w/heat  Position:Supine  Location:Quadriceps, 4 pads co-contract     5 []????  Ice     []????  heat  [x]????  Ice massage Position:sitting  Location: Left Knee        15 min Therapeutic Exercise:  [x]? ?? See flow sheet : Emphasis placed on improving available left knee AROM and LE strength   Rationale: increase ROM and increase strength to improve the patients ability to improve ease with household ADLs     18 min Neuromuscular Re-education:  [x]? ??  See flow sheet : Emphasis placed on improving activation and recruitment of the quadriceps and gluteal musculature and improving LE proprioceptive and kinesthetic awareness   Rationale: increase ROM, increase strength, improve balance and increase proprioception  to improve the patients ability to improve ease with transition to ambulation     8 min Manual Therapy:    Supine, Left Tibial Distraction (OPP)  Supine, Left Hamstring 90/90 Manual Stretch   The manual therapy interventions were performed at a separate and distinct time from the therapeutic activities interventions. Rationale: decrease pain, increase ROM and increase tissue extensibility to improve ease with stair management. With   [] TE   [] TA   [] neuro   [] other: Patient Education: [x] Review HEP    [] Progressed/Changed HEP based on:   [] positioning   [] body mechanics   [] transfers   [] heat/ice application    [] other:      Other Objective/Functional Measures: See goals below. Pain Level (0-10 scale) post treatment: 0    ASSESSMENT/Changes in Function: Patient has demonstrated appropriate post-surgical improvements with patient at this time demonstrating left knee AROM 0-125 degrees with the ability to perform supine SLR x 15 without quadriceps lag. Have successfully been able to transition to use of single axillary crutch with ambulation with opened hinged knee brace with a symmetrical, non-antalgic gait pattern. With slight infrapatellar discomfort noted with clinician monitoring to reduce likelihood of development of patellar tendinopathy. To continue to progress in accordance with post-surgical protocol and MD discretion to patient tolerance to enable patient to return back to PLOF and active lifestyle without limitations. Patient will continue to benefit from skilled PT services to modify and progress therapeutic interventions, address functional mobility deficits, address ROM deficits, address strength deficits, analyze and address soft tissue restrictions, analyze and cue movement patterns, analyze and modify body mechanics/ergonomics, assess and modify postural abnormalities, address imbalance/dizziness and instruct in home and community integration to attain remaining goals. []  See Plan of Care  [x]  See progress note/recertification  []  See Discharge Summary         Progress towards goals / Updated goals:    Short Term Goals: To be accomplished in 3 weeks:  1. Patient will subjectively report full compliance with prescribed HEP. Eval: HEP provided  Current: Met, Patient reports performing HEP. 1/17/22  2. Patient will demonstrate left knee AROM 0-140 degrees to enable patient to progress successfully with post-surgical protocol. Eval: Left Knee AROM = 0 - 5 - 90 degrees  Current: Progressing, Left Knee AROM = 0 - 125 deg, 2/11/2022  3. Patient will demonstrate left supine SLR x15 repetitions with maintenance of terminal knee extension without quadriceps lag (no brace) to allow for successful transition to WBAT when released. Eval: Supine Quad Set = Fair quad contraction  Current: Met, Supine SLR  = Ability to perform without brace without quadriceps lag, 2/8/20022     Long Term Goals: To be accomplished in 6 weeks:  1. Patient will demonstrate a significant functional improvement as demonstrated by a score of >/= 65 on FOTO. Eval: FOTO = 24      Current: Progressing, FOTO = 59, 2/11/2022   2. Patient will demonstrate left SLS >/= 30 seconds to improve safety with community ambulation. Eval: NT per weightbearing precautions  Current: Met, Left SLS = 30 sec  3. Patient will demonstrate ability to perform sit-to-stand from chair with symmetrical weightbearing bilaterally and without pain to improve ease with functional transfers. Eval: NT per weightbearing precautions  Current: Progressing, Sit-to-Stand (table at knee height) = Ability to perform with symmetrical weightbearing, Pain level 2/10     Updated Goals: To be completed in 4 weeks  1. Patient will demonstrate ability to perform bilateral squat 0-90 degrees x10 repetitions with symmetrical form without pain to improve ease with household ADLs. 2/11/2022: Bilateral squat 0-45 degrees x10 repetitions with symmetrical form (with hinged knee brace)  2. Patient will demonstrate ability to perform left SLS (airex) x30 seconds to demonstrate improved ease with ambulation on uneven ground.   2/11/2022: Left SLS (non-Airex) = 30 sec    PLAN  [x]  Upgrade activities as tolerated     [x]  Continue plan of care  []  Update interventions per flow sheet       []  Discharge due to:_  []  Other:_      Meghna Lerma, PT 2/11/2022  7:39 AM    Future Appointments   Date Time Provider Sofía Rodriguez   2/11/2022  1:15 PM Ressie Kawasaki, PT [facility]   2/14/2022  2:45 PM Albina Guillen N Kahlil Jeronimo [facility]   2/21/2022  2:00 PM Osman Barahona [facility]   2/28/2022  1:15 PM Ressie Kawasaki, PT [facility]   3/7/2022  1:15 PM Ressie Kawasaki, PT [facility]   3/14/2022  1:15 PM Jaimie Guillen, PT [facility]

## 2022-02-11 NOTE — PROGRESS NOTES
[facility]              [address]        [place], [address] [place]   [phone] fax    Progress Note  Patient name: Bhumi Marie Start of Care: 2022   Referral source: Morelia Rapp,* : 1990   Medical/Treatment Diagnosis: Left knee pain [M25.562]  Payor: Landon Osei / Plan: [place] [employer] [place] / Product Type: PPO /  Onset Date:  DoS 2021  DoI 2021     Prior Hospitalization: see medical history Provider#: [id number]   Medications: Verified on Patient Summary List     Comorbidities: Left Knee ACL Reconstruction x2 (, 2021)   Prior Level of Function: Father, [employer], Recreational Sports, Work Full-Time  [place] [facility]: [facility]    Missed Visits: 2    Established Goals:          [address] be accomplished in 3 weeks:  1. Patient will subjectively report full compliance with prescribed HEP. Eval: HEP provided  Current: Met, Patient reports performing HEP  2. Patient will demonstrate left knee AROM 0-140 degrees to enable patient to progress successfully with post-surgical protocol. Eval: Left Knee AROM = 0 - 5 - 90 degrees  Current: Progressing, Left Knee AROM = 0 - 125 deg  3. Patient will demonstrate left supine SLR x15 repetitions with maintenance of terminal knee extension without quadriceps lag (no brace) to allow for successful transition to WBAT when released. Eval: Supine Quad Set = Fair quad contraction  Current: Met, Supine SLR  = Ability to perform without brace without quadriceps lag     Long Term Goals: To be accomplished in 6 weeks:  1. Patient will demonstrate a significant functional improvement as demonstrated by a score of >/= 65 on FOTO. Eval: FOTO = 24      Current: Progressing, FOTO = 59  2. Patient will demonstrate left SLS >/= 30 seconds to improve safety with community ambulation. Eval: NT per weightbearing precautions  Current: Met, Left SLS = 30 sec  3.  Patient will demonstrate ability to perform sit-to-stand from chair with symmetrical weightbearing bilaterally and without pain to improve ease with functional transfers. Eval: NT per weightbearing precautions  Current: Progressing, Sit-to-Stand (table at knee height) = Ability to perform with symmetrical weightbearing, Pain level 2/10    Key Functional Changes: See goals above. Updated Goals: To be completed in 4 weeks  1. Patient will demonstrate ability to perform bilateral squat 0-90 degrees x10 repetitions with symmetrical form without pain to improve ease with household ADLs. At PN: Bilateral squat 0-45 degrees x10 repetitions with symmetrical form (with hinged knee brace)  2. Patient will demonstrate ability to perform left SLS (airex) x30 seconds to demonstrate improved ease with ambulation on uneven ground. At PN: Left SLS (non-Airex) = 30 sec  3. Patient will demonstrate left knee AROM 0-140 degrees to enable patient to progress successfully with post-surgical protocol. At PN: Progressing, Left Knee AROM = 0 - 125 deg  4. Patient will demonstrate a significant functional improvement as demonstrated by a score of >/= 65 on FOTO. At PN: Arlyce Daft = 59  5. Patient will demonstrate ability to perform sit-to-stand from chair with symmetrical weightbearing bilaterally and without pain to improve ease with functional transfers. At PN: Progressing, Sit-to-Stand (table at knee height) = Ability to perform with symmetrical weightbearing, Pain level 2/10    ASSESSMENT/RECOMMENDATIONS:    Patient has demonstrated appropriate post-surgical improvements with patient at this time demonstrating left knee AROM 0-125 degrees with the ability to perform supine SLR x 15 without quadriceps lag. Have successfully been able to transition to use of single axillary crutch with ambulation with opened hinged knee brace with a symmetrical, non-antalgic gait pattern.  With slight infrapatellar discomfort noted with clinician monitoring to reduce likelihood of development of patellar tendinopathy. To continue to progress in accordance with post-surgical protocol and MD discretion to patient tolerance to enable patient to return back to PLOF and active lifestyle without limitations.      Patient will continue to benefit from skilled PT services to modify and progress therapeutic interventions, address functional mobility deficits, address ROM deficits, address strength deficits, analyze and address soft tissue restrictions, analyze and cue movement patterns, analyze and modify body mechanics/ergonomics, assess and modify postural abnormalities, address imbalance/dizziness and instruct in home and community integration to attain remaining goals. [x]Continue therapy per initial plan/protocol at a frequency of  1-2 x per week for 6 weeks  []Continue therapy with the following recommended changes:_____________________      _____________________________________________________________________  []Discontinue therapy progressing towards or have reached established goals  []Discontinue therapy due to lack of appreciable progress towards goals  []Discontinue therapy due to lack of attendance or compliance  []Await Physician's recommendations/decisions regarding therapy  []Other:________________________________________________________________    Thank you for this referral.    Nanette Araiza, PT 2/11/2022 7:43 AM  NOTE TO PHYSICIAN:  PLEASE COMPLETE THE ORDERS BELOW AND   FAX TO [facility]: [phone]  If you are unable to process this request in 24 hours please contact our office: [phone]    []  I have read the above report and request that my patient continue as recommended.   []  I have read the above report and request that my patient continue therapy with the following changes/special instructions:________________________________________  []I have read the above report and request that my patient be discharged from therapy.     Physician's Signature:____________Date:_________TIME:________     Dar Butler  ** Signature, Date and Time must be completed for valid certification **

## 2022-02-14 ENCOUNTER — TELEPHONE (OUTPATIENT)
Dept: PHYSICAL THERAPY | Age: 32
End: 2022-02-14

## 2022-02-15 ENCOUNTER — HOSPITAL ENCOUNTER (OUTPATIENT)
Dept: PHYSICAL THERAPY | Age: 32
Discharge: HOME | End: 2022-02-15
Payer: COMMERCIAL

## 2022-02-15 PROCEDURE — 97112 NEUROMUSCULAR REEDUCATION: CPT

## 2022-02-15 PROCEDURE — 97110 THERAPEUTIC EXERCISES: CPT

## 2022-02-15 NOTE — PROGRESS NOTES
PT DAILY TREATMENT NOTE     Patient Name: Annabelle Troncoso  Date:2/15/2022  : 1990  [x]  Patient  Verified  Payor: Homer Mccormick / Plan: [place] [employer] / Product Type: PPO /    In time:244  Out time:327  Total Treatment Time (min): 43  Visit #: 1 of 12    Treatment Area: Left knee pain [M25.562]    SUBJECTIVE  Pain Level (0-10 scale): 1  Any medication changes, allergies to medications, adverse drug reactions, diagnosis change, or new procedure performed?: [x] No    [] Yes (see summary sheet for update)  Subjective functional status/changes:   [] No changes reported  Patient reports non-use of crutches with all mobility except for while at work secondary to uneven terrain. OBJECTIVE         Modality rationale: decrease pain to improve the patients ability to decrease difficulty while performing tasks.    Min Type Additional Details     5 []?????  Ice     []?????  heat  [x]?????  Ice massage Position: Sitting  Location: Left Knee        15 min Therapeutic Exercise:  [x]? ??? See flow sheet : Emphasis placed on improving available left knee AROM and LE strength   Rationale: increase ROM and increase strength to improve the patients ability to improve ease with household ADLs     23 min Neuromuscular Re-education:  [x]? ???  See flow sheet : Emphasis placed on improving activation and recruitment of the quadriceps and gluteal musculature and improving LE proprioceptive and kinesthetic awareness   Rationale: increase ROM, increase strength, improve balance and increase proprioception  to improve the patients ability to improve ease with transition to ambulation        With   [] TE   [] TA   [] neuro   [] other: Patient Education: [x] Review HEP    [] Progressed/Changed HEP based on:   [] positioning   [] body mechanics   [] transfers   [] heat/ice application    [] other:      Other Objective/Functional Measures:   Left SLS (Airex) = 18 sec     Pain Level (0-10 scale) post treatment: 0    ASSESSMENT/Changes in Function: With further progression per protocol with brace opened up 10-0-110 degrees to promote a more symmetrical heel-toe gait pattern with patient noted to continue to demonstrate loss of terminal knee extension during left mid-stance phase of gait. Patient provided with updated HEP with reduction in frequency to 1x/week with greater emphasis placed on a progressive home program with post-surgical progression. To assess appropriateness of discharge of crutches/hinged brace next week in accordance with overall progression and post-surgical status. Patient will continue to benefit from skilled PT services to modify and progress therapeutic interventions, address functional mobility deficits, address ROM deficits, address strength deficits, analyze and address soft tissue restrictions, analyze and cue movement patterns, analyze and modify body mechanics/ergonomics, assess and modify postural abnormalities, address imbalance/dizziness and instruct in home and community integration to attain remaining goals. []  See Plan of Care  []  See progress note/recertification  []  See Discharge Summary         Progress towards goals / Updated goals:    Updated Goals: To be completed in 4 weeks  1. Patient will demonstrate ability to perform bilateral squat 0-90 degrees x10 repetitions with symmetrical form without pain to improve ease with household ADLs. At PN: Bilateral squat 0-45 degrees x10 repetitions with symmetrical form (with hinged knee brace)  2. Patient will demonstrate ability to perform left SLS (airex) x30 seconds to demonstrate improved ease with ambulation on uneven ground. At PN: Left SLS (non-Airex) = 30 sec  Current: Progressing, Left SLS (Airex) = 18 sec, 2/15/2022  3. Patient will demonstrate left knee AROM 0-140 degrees to enable patient to progress successfully with post-surgical protocol. At PN: Progressing, Left Knee AROM = 0 - 125 deg  4.  Patient will demonstrate a significant functional improvement as demonstrated by a score of >/= 65 on FOTO. At PN: Wilner Montano = 59  5. Patient will demonstrate ability to perform sit-to-stand from chair with symmetrical weightbearing bilaterally and without pain to improve ease with functional transfers.   At PN: Progressing, Sit-to-Stand (table at knee height) = Ability to perform with symmetrical weightbearing, Pain level 2/10    PLAN  [x]  Upgrade activities as tolerated     [x]  Continue plan of care  []  Update interventions per flow sheet       []  Discharge due to:_  []  Other:_      Lynn Joy, PT 2/15/2022  7:13 AM    Future Appointments   Date Time Provider Sofía Rodriguez   2/15/2022  2:45 PM Renato Wang [facility]   2/21/2022  2:00 PM Sudeep Mukherjee [facility]   2/28/2022  1:15 PM Phong Bass PT [facility]   3/7/2022  1:15 PM Phong Bass PT [facility]   3/14/2022  1:15 PM Romain Guillen PT [facility]

## 2022-02-21 ENCOUNTER — HOSPITAL ENCOUNTER (OUTPATIENT)
Dept: PHYSICAL THERAPY | Age: 32
Discharge: HOME | End: 2022-02-21
Payer: COMMERCIAL

## 2022-02-21 PROCEDURE — 97112 NEUROMUSCULAR REEDUCATION: CPT

## 2022-02-21 PROCEDURE — 97110 THERAPEUTIC EXERCISES: CPT

## 2022-02-21 NOTE — PROGRESS NOTES
PT DAILY TREATMENT NOTE     Patient Name: Nithya Archuleta  Date:2022  : 1990  [x]  Patient  Verified  Payor: Wallace Araiza / [place]: [place] [employer] [place] / Product Type: PPO /    In time:1:59  Out time:2:43  Total Treatment Time (min): 44  Visit #: 2 of 12  Treatment Area: Left knee pain [M25.562]    SUBJECTIVE  Pain Level (0-10 scale): 0  Any medication changes, allergies to medications, adverse drug reactions, diagnosis change, or new procedure performed?: [x] No    [] Yes (see summary sheet for update)  Subjective functional status/changes:   [] No changes reported  Patient states he did well over the weekend with walking in [facility] without use of crutches. Patient states he was having more pain along his foot than his knee. OBJECTIVE    21 min Therapeutic Exercise:  [x]? ???? See flow sheet : Emphasis placed on improving available left knee AROM and LE strength   Rationale: increase ROM and increase strength to improve the patients ability to improve ease with household ADLs     23 min Neuromuscular Re-education:  [x]? ????  See flow sheet : Emphasis placed on improving activation and recruitment of the quadriceps and gluteal musculature and improving LE proprioceptive and kinesthetic awareness   Rationale: increase ROM, increase strength, improve balance and increase proprioception  to improve the patients ability to improve ease with transition to ambulation                                                  With   [] TE   [] TA   [] neuro   [] other: Patient Education: [x] Review HEP    [] Progressed/Changed HEP based on:   [] positioning   [] body mechanics   [] transfers   [] heat/ice application    [] other:      Other Objective/Functional Measures: left knee AROM 0-132 deg. Pain Level (0-10 scale) post treatment: 0    ASSESSMENT/Changes in Function: Patient performed exercises without use of brace with good quad control. Patient continues to maintain left knee AROM.      Patient will continue to benefit from skilled PT services to modify and progress therapeutic interventions, address functional mobility deficits, address ROM deficits, address strength deficits, analyze and address soft tissue restrictions and analyze and cue movement patterns to attain remaining goals. []  See Plan of Care  []  See progress note/recertification  []  See Discharge Summary         Progress towards goals / Updated goals:  Updated Goals: To be completed in 4 weeks  1. Patient will demonstrate ability to perform bilateral squat 0-90 degrees x10 repetitions with symmetrical form without pain to improve ease with household ADLs. At PN: Bilateral squat 0-45 degrees x10 repetitions with symmetrical form (with hinged knee brace)  2. Patient will demonstrate ability to perform left SLS (airex) x30 seconds to demonstrate improved ease with ambulation on uneven ground. At PN: Left SLS (non-Airex) = 30 sec  Current: Progressing, Left SLS (Airex) = 18 sec, 2/15/2022  3. Patient will demonstrate left knee AROM 0-140 degrees to enable patient to progress successfully with post-surgical protocol. At PN: Progressing, Left Knee AROM = 0 - 125 deg  Current: Progressing: left knee AROM 0-132 deg. 2/21/22  4. Patient will demonstrate a significant functional improvement as demonstrated by a score of >/= 65 on FOTO. At PN: Cristino Salma = 59  5. Patient will demonstrate ability to perform sit-to-stand from chair with symmetrical weightbearing bilaterally and without pain to improve ease with functional transfers.   At PN: Progressing, Sit-to-Stand (table at knee height) = Ability to perform with symmetrical weightbearing, Pain level 2/10       PLAN  []  Upgrade activities as tolerated     [x]  Continue plan of care  []  Update interventions per flow sheet       []  Discharge due to:_  []  Other:_      Lui Beauchamp, CLAUDINE 2/21/2022  12:06 PM    Future Appointments   Date Time Provider Sofía Rodriguez   2/21/2022  2:00 PM [place] [facility]   2/28/2022  1:15 PM Tennille Black [place] [facility]   3/7/2022  1:15 PM Tennille Black [place] [facility]   3/14/2022  1:15 PM Tennille Black,  [facility]

## 2022-02-28 ENCOUNTER — HOSPITAL ENCOUNTER (OUTPATIENT)
Dept: PHYSICAL THERAPY | Age: 32
Discharge: HOME | End: 2022-02-28
Payer: COMMERCIAL

## 2022-02-28 PROCEDURE — 97110 THERAPEUTIC EXERCISES: CPT

## 2022-02-28 PROCEDURE — 97112 NEUROMUSCULAR REEDUCATION: CPT

## 2022-02-28 NOTE — PROGRESS NOTES
PT DAILY TREATMENT NOTE     Patient Name: Wilberto Mulligan  Date:2022  : 1990  [x]  Patient  Verified  Payor: [employer] / Plan: [place] [employer] [place] / Product Type: PPO /    In time:115  Out time:204  Total Treatment Time (min): 49  Visit #: 3 of 12    Treatment Area: Left knee pain [M25.562]    SUBJECTIVE  Pain Level (0-10 scale): 0  Any medication changes, allergies to medications, adverse drug reactions, diagnosis change, or new procedure performed?: [x] No    [] Yes (see summary sheet for update)  Subjective functional status/changes:   [] No changes reported  Patient reports complete cessation of use of crutches but reports continued swelling to the superior tibial region with use of hinged brace at work. OBJECTIVE     10 min Therapeutic Exercise:  [x]? ??? See flow sheet : Emphasis placed on improving available left knee AROM and LE strength   Rationale: increase ROM and increase strength to improve the patients ability to improve ease with household ADLs     39 min Neuromuscular Re-education:  [x]? ???  See flow sheet : Emphasis placed on improving activation and recruitment of the quadriceps and gluteal musculature and improving LE proprioceptive and kinesthetic awareness   Rationale: increase ROM, increase strength, improve balance and increase proprioception  to improve the patients ability to improve ease with transition to ambulation         With   [] TE   [] TA   [] neuro   [] other: Patient Education: [x] Review HEP    [] Progressed/Changed HEP based on:   [] positioning   [] body mechanics   [] transfers   [] heat/ice application    [] other:      Other Objective/Functional Measures:   Left Knee AROM = 2 - 0 - 132 degrees     Pain Level (0-10 scale) post treatment: 0    ASSESSMENT/Changes in Function: With further progression of concentric and eccentric quadriceps strengthening with emphasis placed on strengthening with maintenance of neutral LE rotation.  Patient demonstrates ability to perform supine SLR x20 repetitions without quadriceps lag with patient advised, to his comfort level, to cease use of hinged knee brace while at work. Patient advised to continue to use hinged knee brace PRN. Patient will continue to benefit from skilled PT services to modify and progress therapeutic interventions, address functional mobility deficits, address ROM deficits, address strength deficits, analyze and address soft tissue restrictions, analyze and cue movement patterns, analyze and modify body mechanics/ergonomics, assess and modify postural abnormalities, address imbalance/dizziness and instruct in home and community integration to attain remaining goals. []  See Plan of Care  []  See progress note/recertification  []  See Discharge Summary         Progress towards goals / Updated goals:    Updated Goals: To be completed in 4 weeks  1. Patient will demonstrate ability to perform bilateral squat 0-90 degrees x10 repetitions with symmetrical form without pain to improve ease with household ADLs. At PN: Bilateral squat 0-45 degrees x10 repetitions with symmetrical form (with hinged knee brace)  2. Patient will demonstrate ability to perform left SLS (airex) x30 seconds to demonstrate improved ease with ambulation on uneven ground. At PN: Left SLS (non-Airex) = 30 sec  Current: Progressing, Left SLS (Airex) = 18 sec, 2/15/2022  3. Patient will demonstrate left knee AROM 0-140 degrees to enable patient to progress successfully with post-surgical protocol. At PN: Progressing, Left Knee AROM = 0 - 125 deg  Current: Progressing, Left Knee AROM = 2 - 0 - 132 degrees, 2/28/2022  4. Patient will demonstrate a significant functional improvement as demonstrated by a score of >/= 65 on FOTO. At PN: Kellie Netters = 59  5. Patient will demonstrate ability to perform sit-to-stand from chair with symmetrical weightbearing bilaterally and without pain to improve ease with functional transfers.   At PN: Progressing, Sit-to-Stand (table at knee height) = Ability to perform with symmetrical weightbearing, Pain level 2/10    PLAN  [x]  Upgrade activities as tolerated     [x]  Continue plan of care  []  Update interventions per flow sheet       []  Discharge due to:_  []  Other:_      Elyssa Prieto, PT 2/28/2022  9:39 AM    Future Appointments   Date Time Provider Sofía Rodriguez   2/28/2022  1:15 PM [place], [address] [facility]   3/7/2022  1:15 PM Joann Wolf [place] [facility]   3/14/2022  1:15 PM Joann Wolf PT [facility]

## 2022-03-07 ENCOUNTER — HOSPITAL ENCOUNTER (OUTPATIENT)
Dept: PHYSICAL THERAPY | Age: 32
Discharge: HOME | End: 2022-03-07
Payer: COMMERCIAL

## 2022-03-07 PROCEDURE — 97112 NEUROMUSCULAR REEDUCATION: CPT

## 2022-03-07 PROCEDURE — 97110 THERAPEUTIC EXERCISES: CPT

## 2022-03-07 NOTE — PROGRESS NOTES
[facility]              [address] [place], [address] [place]   [phone] fax    Progress Note  Patient name: Leisa Jefferson Start of Care: 2022   Referral source: Jama Paz,* : 1990   Medical/Treatment Diagnosis: Left knee pain [M25.562]  Payor: Vinay Donovange / Plan: [place] [employer] P[place] / Product Type: PPO /  Onset Date:  DoS 2021  DoI 2021     Prior Hospitalization: see medical history Provider#: [id number]   Medications: Verified on Patient Summary List     Comorbidities: Left Knee ACL Reconstruction x2 (, 2021)   Prior Level of Function: Father, [employer], Recreational Sports, Work Full-Time  [facility]: [facility]    Missed Visits: 3    Established Goals:       Updated Goals: To be completed in 4 weeks  1. Patient will demonstrate ability to perform bilateral squat 0-90 degrees x10 repetitions with symmetrical form without pain to improve ease with household ADLs. At Last PN: Bilateral squat 0-45 degrees x10 repetitions with symmetrical form (with hinged knee brace)  Current: Progressing, Bilateral squat 0-90 degrees x10 repetitions with symmetrical form (no brace) with pain provocation 7/10  2. Patient will demonstrate ability to perform left SLS (airex) x30 seconds to demonstrate improved ease with ambulation on uneven ground. At Last PN: Left SLS (non-Airex) = 30 sec  Current: Progressing, Left SLS (Airex) = 18 sec  3. Patient will demonstrate left knee AROM 0-140 degrees to enable patient to progress successfully with post-surgical protocol. At Last PN: Progressing, Left Knee AROM = 0 - 125 deg  Current: Progressing, Left Knee AROM = 2 - 0 - 132 degrees  4. Patient will demonstrate a significant functional improvement as demonstrated by a score of >/= 65 on FOTO. At Last PN: Progressing, FOTO = 59  Current: Met, FOTO = 68  5.  Patient will demonstrate ability to perform sit-to-stand from chair with symmetrical weightbearing bilaterally and without pain to improve ease with functional transfers. At Last PN: Progressing, Sit-to-Stand (table at knee height) = Ability to perform with symmetrical weightbearing, Pain level 2/10  Current: Met, Sit-to-Stand (chair) = Ability to perform with symmetrical weightbearing without pain provocation    Key Functional Changes: See goals above. Updated Goals: To be completed in 4 weeks. 1. Patient will demonstrate ability to perform SL sit-to-stand from 18\" box x10 repetitions with correct form and without UE assist to improve ease ease with recreation. At PN: SL Sit-to-Stand (24\" Box) x5 repetitions = Single UE assist required with verbal cuing needed to correct form  2. Patient will demonstrate ability to perform left LE 6\" forward heel tap x5 repetitions with correct form and without pain provocation without UE assist to demonstrate improved ease and safety with stair management. At PN: Left LE 2\" Forward Heel Tap x5 Repetitions = Contralateral UE assist required with slight contralateral hip drop; Infrapatellar pain 7/10  3. Patient will demonstrate ability to jog at self-selected pace x5 minutes on treadmill with a symmetrical stride without pain to improve ease with return back to recreation. At PN: NT  4. Patient will demonstrate ability to perform bilateral squat 0-90 degrees x10 repetitions with symmetrical form without pain to improve ease with household ADLs. At PN: Progressing, Bilateral squat 0-90 degrees x10 repetitions with symmetrical form (no brace) with pain provocation 7/10  5. Patient will demonstrate ability to perform left SLS (airex) x30 seconds to demonstrate improved ease with ambulation on uneven ground. At PN: Progressing, Left SLS (Airex) = 18 sec  6. Patient will demonstrate left knee AROM 0-140 degrees to enable patient to progress successfully with post-surgical protocol.   At PN: Progressing, Left Knee AROM = 2 - 0 - 132 degrees    ASSESSMENT/RECOMMENDATIONS:    Patient has demonstrated good post-surgical progression with patient at this time ambulating without use of AD nor hinged brace with a non-antalgic, symmetrical gait pattern. Left knee AROM 2-0-132 degrees with patient demonstrating ability to perform supine SLR x20 without quadriceps lag but noted with continued eccentric quadriceps weakness. Patient does continue to demonstrate some minor irritation/inflammation of the patellar tendon with continued monitoring. To continue to progress per post-surgical protocol to patient tolerance to optimize patient ease with return to PLOF.      Patient will continue to benefit from skilled PT services to modify and progress therapeutic interventions, address functional mobility deficits, address ROM deficits, address strength deficits, analyze and address soft tissue restrictions, analyze and cue movement patterns, analyze and modify body mechanics/ergonomics, assess and modify postural abnormalities, address imbalance/dizziness and instruct in home and community integration to attain remaining goals.     [x]Continue therapy per initial plan/protocol at a frequency of  1-2 x per week for 4 weeks  []Continue therapy with the following recommended changes:_____________________      _____________________________________________________________________  []Discontinue therapy progressing towards or have reached established goals  []Discontinue therapy due to lack of appreciable progress towards goals  []Discontinue therapy due to lack of attendance or compliance  []Await Physician's recommendations/decisions regarding therapy  []Other:________________________________________________________________    Thank you for this referral.    Jose Pollock, PT 3/7/2022 9:38 AM  NOTE TO PHYSICIAN:  PLEASE COMPLETE THE ORDERS BELOW AND   FAX TO [facility]: [phone]  If you are unable to process this request in 24 hours please contact our office: [phone]    []  I have read the above report and request that my patient continue as recommended. []  I have read the above report and request that my patient continue therapy with the following changes/special instructions:________________________________________  []I have read the above report and request that my patient be discharged from therapy.     Physician's Signature:____________Date:_________TIME:________     Que Girard  ** Signature, Date and Time must be completed for valid certification **

## 2022-03-07 NOTE — PROGRESS NOTES
PT DAILY TREATMENT NOTE     Patient Name: Kayley Piedra  Date:3/7/2022  : 1990  [x]  Patient  Verified  Payor: OPTIMA / Plan: [place] [employer] / Product Type: PPO /    In time:115  Out time:204  Total Treatment Time (min): 49  Visit #: 4 of 12    Treatment Area: Left knee pain [M25.562]    SUBJECTIVE  Pain Level (0-10 scale): 3  Any medication changes, allergies to medications, adverse drug reactions, diagnosis change, or new procedure performed?: [x] No    [] Yes (see summary sheet for update)  Subjective functional status/changes:   [] No changes reported  Patient reports complete cessation of use of knee brace and AD. OBJECTIVE    Modality rationale: decrease inflammation and decrease pain to improve the patients ability to improve ease with sleep   Min Type Additional Details   5 []  Ice     []  heat  [x]  Ice massage Position: Seated  Location: Left Infrapatellar Region - Post-Tx     10 min Therapeutic Exercise:  [x]? ???? See flow sheet : Emphasis placed on improving available left knee AROM and LE strength   Rationale: increase ROM and increase strength to improve the patients ability to improve ease with household ADLs     34 min Neuromuscular Re-education:  [x]? ????  See flow sheet : Emphasis placed on improving activation and recruitment of the quadriceps and gluteal musculature and improving LE proprioceptive and kinesthetic awareness   Rationale: increase ROM, increase strength, improve balance and increase proprioception  to improve the patients ability to improve ease with transition to ambulation       With   [] TE   [] TA   [] neuro   [] other: Patient Education: [x] Review HEP    [] Progressed/Changed HEP based on:   [] positioning   [] body mechanics   [] transfers   [] heat/ice application    [] other:      Other Objective/Functional Measures: See goals below.       Pain Level (0-10 scale) post treatment: 0    ASSESSMENT/Changes in Function: Patient has demonstrated good post-surgical progression with patient at this time ambulating without use of AD nor hinged brace with a non-antalgic, symmetrical gait pattern. Left knee AROM 2-0-132 degrees with patient demonstrating ability to perform supine SLR x20 without quadriceps lag but noted with continued eccentric quadriceps weakness. Patient does continue to demonstrate some minor irritation/inflammation of the patellar tendon with continued monitoring. To continue to progress per post-surgical protocol to patient tolerance to optimize patient ease with return to PLOF. Patient will continue to benefit from skilled PT services to modify and progress therapeutic interventions, address functional mobility deficits, address ROM deficits, address strength deficits, analyze and address soft tissue restrictions, analyze and cue movement patterns, analyze and modify body mechanics/ergonomics, assess and modify postural abnormalities, address imbalance/dizziness and instruct in home and community integration to attain remaining goals. []  See Plan of Care  [x]  See progress note/recertification  []  See Discharge Summary         Progress towards goals / Updated goals:    Updated Goals: To be completed in 4 weeks  1. Patient will demonstrate ability to perform bilateral squat 0-90 degrees x10 repetitions with symmetrical form without pain to improve ease with household ADLs. At PN: Bilateral squat 0-45 degrees x10 repetitions with symmetrical form (with hinged knee brace)  2. Patient will demonstrate ability to perform left SLS (airex) x30 seconds to demonstrate improved ease with ambulation on uneven ground. At PN: Left SLS (non-Airex) = 30 sec  Current: Progressing, Left SLS (Airex) = 18 sec, 2/15/2022  3. Patient will demonstrate left knee AROM 0-140 degrees to enable patient to progress successfully with post-surgical protocol.   At PN: Progressing, Left Knee AROM = 0 - 125 deg  Current: Progressing, Left Knee AROM = 2 - 0 - 132 degrees, 2/28/2022  4. Patient will demonstrate a significant functional improvement as demonstrated by a score of >/= 65 on FOTO. At PN: Progressing, FOTO = 59  Current: Met, FOTO = 68, 3/7/2022  5. Patient will demonstrate ability to perform sit-to-stand from chair with symmetrical weightbearing bilaterally and without pain to improve ease with functional transfers. At PN: Progressing, Sit-to-Stand (table at knee height) = Ability to perform with symmetrical weightbearing, Pain level 2/10  Current: Met, Sit-to-Stand (chair) = Ability to perform with symmetrical weightbearing without pain provocation, 3/7/2022    Updated Goals: To be completed in 4 weeks. 1. Patient will demonstrate ability to perform SL sit-to-stand from 18\" box x10 repetitions with correct form and without UE assist to improve ease ease with recreation. 3/7/2022: SL Sit-to-Stand (24\" Box) x5 repetitions = Single UE assist required with verbal cuing needed to correct form  2. Patient will demonstrate ability to perform left LE 6\" forward heel tap x5 repetitions with correct form and without pain provocation without UE assist to demonstrate improved ease and safety with stair management. 3/7/2022: Left LE 2\" Forward Heel Tap x5 Repetitions = Contralateral UE assist required with slight contralateral hip drop; Infrapatellar pain 7/10  3. Patient will demonstrate ability to jog at self-selected pace x5 minutes on treadmill with a symmetrical stride without pain to improve ease with return back to recreation.    3/7/2022: NT    PLAN  [x]  Upgrade activities as tolerated     [x]  Continue plan of care  []  Update interventions per flow sheet       []  Discharge due to:_  []  Other:_      Jose Elias Muñiz PT 3/7/2022  9:36 AM    Future Appointments   Date Time Provider Sofía Rodriguez   3/7/2022  1:15 PM GIANCARLO Gutier[facility]S [facility]   3/14/2022  1:15 PM GIANCARLO Gutierrez [facility]

## 2022-03-14 ENCOUNTER — HOSPITAL ENCOUNTER (OUTPATIENT)
Dept: PHYSICAL THERAPY | Age: 32
Discharge: HOME | End: 2022-03-14
Payer: COMMERCIAL

## 2022-03-14 PROCEDURE — 97112 NEUROMUSCULAR REEDUCATION: CPT

## 2022-03-14 PROCEDURE — 97110 THERAPEUTIC EXERCISES: CPT

## 2022-03-14 NOTE — PROGRESS NOTES
PT DAILY TREATMENT NOTE     Patient Name: Prabhakar Gates  Date:3/14/2022  : 1990  [x]  Patient  Verified  Payor: Karan Kawasaki / [place] [employer] / Product Type: PPO /    In time:115  Out time:200  Total Treatment Time (min): 45  Visit #: 1 of 8    Treatment Area: Left knee pain [M25.562]    SUBJECTIVE  Pain Level (0-10 scale): 2  Any medication changes, allergies to medications, adverse drug reactions, diagnosis change, or new procedure performed?: [x] No    [] Yes (see summary sheet for update)  Subjective functional status/changes:   [] No changes reported  Patient reports continued \"soreness\" to the inferior-medial aspect of the anterior knee with patient reporting that symptoms overall have continued to improve. OBJECTIVE    Modality rationale: decrease inflammation and decrease pain to improve the patients ability to improve ease with sleep   Min Type Additional Details   5 []? Ice     []?  heat  [x]?   Ice massage Position: Seated  Location: Left Infrapatellar Region - Post-Tx     10 min Therapeutic Exercise:  [x]?????? See flow sheet : Emphasis placed on improving available left knee AROM and LE strength   Rationale: increase ROM and increase strength to improve the patients ability to improve ease with household ADLs     30 min Neuromuscular Re-education:  [x]??????  See flow sheet : Emphasis placed on improving activation and recruitment of the quadriceps and gluteal musculature and improving LE proprioceptive and kinesthetic awareness   Rationale: increase ROM, increase strength, improve balance and increase proprioception  to improve the patients ability to improve ease with transition to ambulation        With   [] TE   [] TA   [] neuro   [] other: Patient Education: [x] Review HEP    [] Progressed/Changed HEP based on:   [] positioning   [] body mechanics   [] transfers   [] heat/ice application    [] other:      Other Objective/Functional Measures:   Left Knee AROM = 2 - 0 - 135 degrees     Pain Level (0-10 scale) post treatment: 0    ASSESSMENT/Changes in Function: With continued concentric and eccentric quadriceps weakness with patient noted to demonstrate poor eccentric control with forward stair descent with closed-chain LE instability with weight bearing exercises with non-readiness to progress to next phase of post-surgical protocol. Patient educated regarding importance of full compliance with prescribed HEP to optimize post-surgical efficacy with emphasis placed on quadriceps strengthening. Patient will continue to benefit from skilled PT services to modify and progress therapeutic interventions, address functional mobility deficits, address ROM deficits, address strength deficits, analyze and address soft tissue restrictions, analyze and cue movement patterns, analyze and modify body mechanics/ergonomics, assess and modify postural abnormalities, address imbalance/dizziness and instruct in home and community integration to attain remaining goals. []  See Plan of Care  []  See progress note/recertification  []  See Discharge Summary         Progress towards goals / Updated goals:    Updated Goals: To be completed in 4 weeks. 1. Patient will demonstrate ability to perform SL sit-to-stand from 18\" box x10 repetitions with correct form and without UE assist to improve ease ease with recreation. At PN: SL Sit-to-Stand (24\" Box) x5 repetitions = Single UE assist required with verbal cuing needed to correct form  2. Patient will demonstrate ability to perform left LE 6\" forward heel tap x5 repetitions with correct form and without pain provocation without UE assist to demonstrate improved ease and safety with stair management. At PN: Left LE 2\" Forward Heel Tap x5 Repetitions = Contralateral UE assist required with slight contralateral hip drop; Infrapatellar pain 7/10  3.  Patient will demonstrate ability to jog at self-selected pace x5 minutes on treadmill with a symmetrical stride without pain to improve ease with return back to recreation. At PN: NT  4. Patient will demonstrate ability to perform bilateral squat 0-90 degrees x10 repetitions with symmetrical form without pain to improve ease with household ADLs. At PN: Progressing, Bilateral squat 0-90 degrees x10 repetitions with symmetrical form (no brace) with pain provocation 7/10  5. Patient will demonstrate ability to perform left SLS (airex) x30 seconds to demonstrate improved ease with ambulation on uneven ground. At PN: Progressing, Left SLS (Airex) = 18 sec  6. Patient will demonstrate left knee AROM 0-140 degrees to enable patient to progress successfully with post-surgical protocol.   At PN: Progressing, Left Knee AROM = 2 - 0 - 132 degrees  Current: Progressing, Left Knee AROM = 2 - 0 - 135 degrees, 3/14/2022    PLAN  [x]  Upgrade activities as tolerated     [x]  Continue plan of care  []  Update interventions per flow sheet       []  Discharge due to:_  []  Other:_      Alek Arreola PT 3/14/2022  9:38 AM    Future Appointments   Date Time Provider Sofía Rodriguez   3/14/2022  1:15 PM Monica Gordon, PT [facility]   3/21/2022  2:00 PM Di Dupont [facility]   3/28/2022  2:00 PM Kel Sutherland PTA [facility]   4/4/2022  1:15 PM Indy Guillen, PT [facility]

## 2022-03-21 ENCOUNTER — HOSPITAL ENCOUNTER (OUTPATIENT)
Dept: PHYSICAL THERAPY | Age: 32
Discharge: HOME | End: 2022-03-21
Payer: COMMERCIAL

## 2022-03-21 PROCEDURE — 97112 NEUROMUSCULAR REEDUCATION: CPT

## 2022-03-21 PROCEDURE — 97110 THERAPEUTIC EXERCISES: CPT

## 2022-03-21 NOTE — PROGRESS NOTES
PT DAILY TREATMENT NOTE     Patient Name: Sabine Freeze  Date:3/21/2022  : 1990  [x]  Patient  Verified  Payor: Pamela Armijo / [place] / Product Type: PPO /    In time:201  Out time:253  Total Treatment Time (min): 46  Visit #: 2 of 8    Treatment Area: Left knee pain [M25.562]    SUBJECTIVE  Pain Level (0-10 scale): 0  Any medication changes, allergies to medications, adverse drug reactions, diagnosis change, or new procedure performed?: [x] No    [] Yes (see summary sheet for update)  Subjective functional status/changes:   [] No changes reported  Patient reports follow up with MD last Tuesday with patient stating that MD indicated \"popping\" secondary to the hamstrings. Patient as well reports completion of knee x-ray with hardware intact and without mal-positioning. OBJECTIVE        Modality rationale: decrease inflammation and decrease pain to improve the patients ability to improve ease with sleep   Min Type Additional Details   5 []? ?  Ice     []? ?  heat  [x]? ?  Ice massage Position: Seated  Location: Left Infrapatellar Region - Post-Tx      9 min Therapeutic Exercise:  [x]??????? See flow sheet : Emphasis placed on improving available left knee AROM and LE strength   Rationale: increase ROM and increase strength to improve the patients ability to improve ease with household ADLs     38 min Neuromuscular Re-education:  [x]???????  See flow sheet : Emphasis placed on improving activation and recruitment of the quadriceps and gluteal musculature and improving LE proprioceptive and kinesthetic awareness   Rationale: increase ROM, increase strength, improve balance and increase proprioception  to improve the patients ability to improve ease with transition to ambulation        With   [] TE   [] TA   [] neuro   [] other: Patient Education: [x] Review HEP    [] Progressed/Changed HEP based on:   [] positioning   [] body mechanics   [] transfers   [] heat/ice application    [] other: Other Objective/Functional Measures:   Left SLS (Airex) = 22 sec    Pain Level (0-10 scale) post treatment: 0    ASSESSMENT/Changes in Function: With continued progression of quadriceps strengthening with improved, although continued, concentric and eccentric weakness noted with greater instability noted within the frontal plane v. Sagittal plane with progressive strengthening. With prominent hamstring hypertonicity bilaterally. Patient will continue to benefit from skilled PT services to modify and progress therapeutic interventions, address functional mobility deficits, address ROM deficits, address strength deficits, analyze and address soft tissue restrictions, analyze and cue movement patterns, analyze and modify body mechanics/ergonomics, assess and modify postural abnormalities, address imbalance/dizziness and instruct in home and community integration to attain remaining goals. []  See Plan of Care  []  See progress note/recertification  []  See Discharge Summary         Progress towards goals / Updated goals:    Updated Goals: To be completed in 4 weeks. 1. Patient will demonstrate ability to perform SL sit-to-stand from 18\" box x10 repetitions with correct form and without UE assist to improve ease ease with recreation. At PN: SL Sit-to-Stand (24\" Box) x5 repetitions = Single UE assist required with verbal cuing needed to correct form  2. Patient will demonstrate ability to perform left LE 6\" forward heel tap x5 repetitions with correct form and without pain provocation without UE assist to demonstrate improved ease and safety with stair management. At PN: Left LE 2\" Forward Heel Tap x5 Repetitions = Contralateral UE assist required with slight contralateral hip drop; Infrapatellar pain 7/10  3. Patient will demonstrate ability to jog at self-selected pace x5 minutes on treadmill with a symmetrical stride without pain to improve ease with return back to recreation.   At PN: NT  4.  Patient will demonstrate ability to perform bilateral squat 0-90 degrees x10 repetitions with symmetrical form without pain to improve ease with household ADLs. At PN: Progressing, Bilateral squat 0-90 degrees x10 repetitions with symmetrical form (no brace) with pain provocation 7/10   Current: Progressing, Bilateral squat 0-90 degrees x10 repetitions with symmetrical form (no brace) with pain provocation 2/10, 3/21/2022  5. Patient will demonstrate ability to perform left SLS (airex) x30 seconds to demonstrate improved ease with ambulation on uneven ground. At PN: Progressing, Left SLS (Airex) = 18 sec  Current: Progressing, Left SLS (Airex) = 22 sec, 3/21/2022  6. Patient will demonstrate left knee AROM 0-140 degrees to enable patient to progress successfully with post-surgical protocol.   At PN: Progressing, Left Knee AROM = 2 - 0 - 132 degrees  Current: Progressing, Left Knee AROM = 2 - 0 - 135 degrees, 3/14/2022       PLAN  [x]  Upgrade activities as tolerated     [x]  Continue plan of care  []  Update interventions per flow sheet       []  Discharge due to:_  []  Other:_      Va Marina, PT 3/21/2022  9:41 AM    Future Appointments   Date Time Provider Sofía Rodriguez   3/21/2022  2:00 PM Renato Roth [facility]   3/28/2022  2:00 PM Tani Holden [facility]   4/4/2022  1:15 PM Vikas Guillen, PT [facility]

## 2022-03-22 ENCOUNTER — TELEPHONE (OUTPATIENT)
Dept: PHYSICAL THERAPY | Age: 32
End: 2022-03-22

## 2022-03-28 ENCOUNTER — HOSPITAL ENCOUNTER (OUTPATIENT)
Dept: PHYSICAL THERAPY | Age: 32
Discharge: HOME | End: 2022-03-28
Payer: COMMERCIAL

## 2022-03-28 PROCEDURE — 97110 THERAPEUTIC EXERCISES: CPT

## 2022-03-28 PROCEDURE — 97112 NEUROMUSCULAR REEDUCATION: CPT

## 2022-03-28 NOTE — PROGRESS NOTES
PT DAILY TREATMENT NOTE     Patient Name: Derry Lombard  Date:3/28/2022  : 1990  [x]  Patient  Verified  Payor: Cory Hendricks / [place] [employer] / Product Type: PPO /    In time:200  Out time:248  Total Treatment Time (min): 48  Visit #: 3 of 8    Treatment Area: Left knee pain [M25.562]    SUBJECTIVE  Pain Level (0-10 scale): 0/10  Any medication changes, allergies to medications, adverse drug reactions, diagnosis change, or new procedure performed?: [x] No    [] Yes (see summary sheet for update)  Subjective functional status/changes:   [] No changes reported  Pt states knee feels good today with no pain    OBJECTIVE    Modality rationale: decrease inflammation and decrease pain to improve the patients ability to improve ease with sleep   Min Type Additional Details    [] Estim:  []Unatt       []IFC  []Premod                        []Other:  []w/ice   []w/heat  Position:  Location:    [] Estim: []Att    []TENS instruct  []NMES                    []Other:  []w/US   []w/ice   []w/heat  Position:  Location:    []  Traction: [] Cervical       []Lumbar                       [] Prone          []Supine                       []Intermittent   []Continuous Lbs:  [] before manual  [] after manual    []  Ultrasound: []Continuous   [] Pulsed                           []1MHz   []3MHz W/cm2:  Location:    []  Iontophoresis with dexamethasone         Location: [] Take home patch   [] In clinic   5 []  Ice     []  heat  [x]  Ice massage  []  Laser   []  Anodyne Position:seated  Location:left infrapatellar region    []  Laser with stim  []  Other:  Position:  Location:    []  Vasopneumatic Device    []  Right     []  Left  Pre-treatment girth:  Post-treatment girth:  Measured at (location):  Pressure:       [] lo [] med [] hi   Temperature: [] lo [] med [] hi   [] Skin assessment post-treatment:  []intact []redness- no adverse reaction    []redness  adverse reaction:       18 min Therapeutic Exercise:  [x] See flow sheet :   Rationale: increase ROM, increase strength, improve coordination, improve balance and increase proprioception to improve the patients ability to perform ADL's     25 min Neuromuscular Re-education:  [x]  See flow sheet :   Rationale: increase ROM, increase strength, improve coordination, improve balance and increase proprioception  to improve the patients ability to perform ADL's safely          With   [] TE   [] TA   [] neuro   [] other: Patient Education: [x] Review HEP    [] Progressed/Changed HEP based on:   [] positioning   [] body mechanics   [] transfers   [] heat/ice application    [] other:      Other Objective/Functional Measures:   Increased reps for shuttle press   Added 3 way rebounder with red ball  Added lateral band walks with OTB    Pain Level (0-10 scale) post treatment: 0/10    ASSESSMENT/Changes in Function: Pt making slow but steady progress towards goals. Pt tolerated exercises well this treatment session. Improved SLS noted but pt challenged with fwd step downs d/t poor eccentric quad control. Will continue to progress as tolerated to increase functional strength and mobility for ease of ADL's. Patient will continue to benefit from skilled PT services to modify and progress therapeutic interventions, address functional mobility deficits, address ROM deficits and address strength deficits to attain remaining goals. []  See Plan of Care  []  See progress note/recertification  []  See Discharge Summary         Progress towards goals / Updated goals:  Updated Goals: To be completed in 4 weeks. 1. Patient will demonstrate ability to perform SL sit-to-stand from 18\" box x10 repetitions with correct form and without UE assist to improve ease ease with recreation. At PN: SL Sit-to-Stand (24\" Box) x5 repetitions = Single UE assist required with verbal cuing needed to correct form  2.  Patient will demonstrate ability to perform left LE 6\" forward heel tap x5 repetitions with correct form and without pain provocation without UE assist to demonstrate improved ease and safety with stair management. At PN: Left LE 2\" Forward Heel Tap x5 Repetitions = Contralateral UE assist required with slight contralateral hip drop; Infrapatellar pain 7/10  3. Patient will demonstrate ability to jog at self-selected pace x5 minutes on treadmill with a symmetrical stride without pain to improve ease with return back to recreation.   At PN: NT  4. Patient will demonstrate ability to perform bilateral squat 0-90 degrees x10 repetitions with symmetrical form without pain to improve ease with household ADLs. At PN: Progressing, Bilateral squat 0-90 degrees x10 repetitions with symmetrical form (no brace) with pain provocation 7/10   Current: Progressing, Bilateral squat 0-90 degrees x10 repetitions with symmetrical form (no brace) with pain provocation 2/10, 3/21/2022  5. Patient will demonstrate ability to perform left SLS (airex) x30 seconds to demonstrate improved ease with ambulation on uneven ground. At PN: Progressing, Left SLS (Airex) = 18 sec  Current: Progressing, Left SLS (Airex) = 22 sec, 3/21/2022, MET 3/28/22  6. Patient will demonstrate left knee AROM 0-140 degrees to enable patient to progress successfully with post-surgical protocol.   At PN: Progressing, Left Knee AROM = 2 - 0 - 132 degrees  Current: Progressing, Left Knee AROM = 2 - 0 - 135 degrees, 3/14/2022    PLAN  []  Upgrade activities as tolerated     [x]  Continue plan of care  []  Update interventions per flow sheet       []  Discharge due to:_  []  Other:_      Dorian Arriaza, CLAUDINE 3/28/2022  1:37 PM    Future Appointments   Date Time Provider Sofía Rodriguez   3/28/2022  2:00 PM Kayley Grove [facility]   4/4/2022  1:15 PM Bikkers, Sung Osler, PT [facility]

## 2022-04-04 ENCOUNTER — HOSPITAL ENCOUNTER (OUTPATIENT)
Dept: PHYSICAL THERAPY | Age: 32
Discharge: HOME | End: 2022-04-04
Payer: COMMERCIAL

## 2022-04-04 PROCEDURE — 97110 THERAPEUTIC EXERCISES: CPT

## 2022-04-04 PROCEDURE — 97112 NEUROMUSCULAR REEDUCATION: CPT

## 2022-04-04 NOTE — PROGRESS NOTES
PT DAILY TREATMENT NOTE     Patient Name: Cristine Azevedo  Date:2022  : 1990  [x]  Patient  Verified  Payor: Geroge Gaucher / [place]: [place] / Product Type: PPO /    In time:115  Out time:203  Total Treatment Time (min): 48  Visit #: 4 of 8    Treatment Area: Left knee pain [M25.562]    SUBJECTIVE  Pain Level (0-10 scale): 0  Any medication changes, allergies to medications, adverse drug reactions, diagnosis change, or new procedure performed?: [x] No    [] Yes (see summary sheet for update)  Subjective functional status/changes:   [] No changes reported  Patient reports continued intermittent pain. OBJECTIVE    10 min Therapeutic Exercise:  [x]???????? See flow sheet : Emphasis placed on improving available left knee AROM and LE strength   Rationale: increase ROM and increase strength to improve the patients ability to improve ease with household ADLs     38 min Neuromuscular Re-education:  [x]????????  See flow sheet : Emphasis placed on improving activation and recruitment of the quadriceps and gluteal musculature and improving LE proprioceptive and kinesthetic awareness   Rationale: increase ROM, increase strength, improve balance and increase proprioception  to improve the patients ability to improve ease with transition to ambulation        With   [] TE   [] TA   [] neuro   [] other: Patient Education: [x] Review HEP    [] Progressed/Changed HEP based on:   [] positioning   [] body mechanics   [] transfers   [] heat/ice application    [] other:      Other Objective/Functional Measures: See goals below. Pain Level (0-10 scale) post treatment: 0    ASSESSMENT/Changes in Function: Jose has demonstrated good progress towards created therapeutic goals post-surgically with left knee AROM 2-0-140 degrees and supine SLR x20 without quadriceps lag. Patient does continue to demonstrate poor eccentric quadriceps control as noted with performance of forward heel taps, step downs and squatting. With continued, although improved, discomfort localized to the patellar tendon with continued monitoring. With closed-chain strengthening with greater instability noted within the frontal plane v. Sagittal plane. With prominent hypertonicity of the hamstring musculature bilaterally. Patient will continue to benefit from skilled PT services to modify and progress therapeutic interventions, address functional mobility deficits, address ROM deficits, address strength deficits, analyze and address soft tissue restrictions, analyze and cue movement patterns, analyze and modify body mechanics/ergonomics, assess and modify postural abnormalities, address imbalance/dizziness and instruct in home and community integration to attain remaining goals. []  See Plan of Care  [x]  See progress note/recertification  []  See Discharge Summary         Progress towards goals / Updated goals:    Updated Goals: To be completed in 4 weeks. 1. Patient will demonstrate ability to perform SL sit-to-stand from 18\" box x10 repetitions with correct form and without UE assist to improve ease ease with recreation. At PN: SL Sit-to-Stand (24\" Box) x5 repetitions = Single UE assist required with verbal cuing needed to correct form  Current: Remains, SL Sit-to-Stand (24\" Box) x5 repetitions = Single UE assist required with verbal cuing needed to correct form, 4/4/2022  2. Patient will demonstrate ability to perform left LE 6\" forward heel tap x5 repetitions with correct form and without pain provocation without UE assist to demonstrate improved ease and safety with stair management. At PN: Left LE 2\" Forward Heel Tap x5 Repetitions = Contralateral UE assist required with slight contralateral hip drop; Infrapatellar pain 7/10  Current: Progressing, Left LE 4\" Forward Heel Tap x10 Repetitions = Contralateral UE assist required with slight contralateral hip drop; Infrapatellar pain 3/10, 4/4/2022  3.  Patient will demonstrate ability to jog at self-selected pace x5 minutes on treadmill with a symmetrical stride without pain to improve ease with return back to recreation.   At PN: NT  Current: Remains, NT per protocol/patient, 4/4/2022  4. Patient will demonstrate ability to perform bilateral squat 0-90 degrees x10 repetitions with symmetrical form without pain to improve ease with household ADLs. At PN: Progressing, Bilateral squat 0-90 degrees x10 repetitions with symmetrical form (no brace) with pain provocation 7/10   Current: Met, Bilateral squat 0-90 degrees x10 repetitions with symmetrical form (no brace) without pain provocation, 4/4/2022  5. Patient will demonstrate ability to perform left SLS (airex) x30 seconds to demonstrate improved ease with ambulation on uneven ground. At PN: Progressing, Left SLS (Airex) = 18 sec  Current: Met, Left SLS (Airex) = 30 sec, 4/4/2022  6. Patient will demonstrate left knee AROM 0-140 degrees to enable patient to progress successfully with post-surgical protocol.   At PN: Progressing, Left Knee AROM = 2 - 0 - 132 degrees  Current: Met, Left Knee AROM = 2 - 0 - 140 degrees, 4/4/2022    PLAN  [x]  Upgrade activities as tolerated     [x]  Continue plan of care  []  Update interventions per flow sheet       []  Discharge due to:_  []  Other:_      Louann Flower PT 4/4/2022  9:02 AM    Future Appointments   Date Time Provider Sofía Rodriguez   4/4/2022  1:15 PM July Miramontes PT [facility]

## 2022-04-04 NOTE — PROGRESS NOTES
[facility]              [address]        [place], [address]   [phone] fax    Progress Note  Patient name: Parviz Alexis Start of Care: 2022   Referral source: Moshe Santa,* : 1990   Medical/Treatment Diagnosis: Left knee pain [M25.562]  Payor: Pop Case / [place]: [place] [employer] / Product Type: PPO /  Onset Date:  DoS 2021  DoI 2021     Prior Hospitalization: see medical history Provider#: [id number]   Medications: Verified on Patient Summary List     Comorbidities: Left Knee ACL Reconstruction x2 (, 2021)   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 1. Patient will demonstrate ability to perform SL sit-to-stand from 18\" box x10 repetitions with correct form and without UE assist to improve ease ease with recreation. At PN: SL Sit-to-Stand (24\" Box) x5 repetitions = Single UE assist required with verbal cuing needed to correct form  Current: Remains, SL Sit-to-Stand (24\" Box) x5 repetitions = Single UE assist required with verbal cuing needed to correct form  2. Patient will demonstrate ability to perform left LE 6\" forward heel tap x5 repetitions with correct form and without pain provocation without UE assist to demonstrate improved ease and safety with stair management. At PN: Left LE 2\" Forward Heel Tap x5 Repetitions = Contralateral UE assist required with slight contralateral hip drop; Infrapatellar pain 7/10  Current: Progressing, Left LE 4\" Forward Heel Tap x10 Repetitions = Contralateral UE assist required with slight contralateral hip drop; Infrapatellar pain 3/10  3.  Patient will demonstrate ability to jog at self-selected pace x5 minutes on treadmill with a symmetrical stride without pain to improve ease with return back to recreation.   At PN: NT  Current: Remains, NT per protocol/patient  4. Patient will demonstrate ability to perform bilateral squat 0-90 degrees x10 repetitions with symmetrical form without pain to improve ease with household ADLs. At PN: Progressing, Bilateral squat 0-90 degrees x10 repetitions with symmetrical form (no brace) with pain provocation 7/10   Current: Met, Bilateral squat 0-90 degrees x10 repetitions with symmetrical form (no brace) without pain provocation  5. Patient will demonstrate ability to perform left SLS (airex) x30 seconds to demonstrate improved ease with ambulation on uneven ground. At PN: Progressing, Left SLS (Airex) = 18 sec  Current: Met, Left SLS (Airex) = 30 sec  6. Patient will demonstrate left knee AROM 0-140 degrees to enable patient to progress successfully with post-surgical protocol. At PN: Progressing, Left Knee AROM = 2 - 0 - 132 degrees  Current: Met, Left Knee AROM = 2 - 0 - 140 degrees    Key Functional Changes: See goals above. Updated Goals: to be achieved in 4 weeks:  1. Patient will demonstrate ability to perform SL sit-to-stand from 18\" box x10 repetitions with correct form and without UE assist to improve ease ease with recreation. At PN: Remains, SL Sit-to-Stand (24\" Box) x5 repetitions = Single UE assist required with verbal cuing needed to correct form  2. Patient will demonstrate ability to perform left LE 6\" forward heel tap x5 repetitions with correct form and without pain provocation without UE assist to demonstrate improved ease and safety with stair management. At PN: Progressing, Left LE 4\" Forward Heel Tap x10 Repetitions = Contralateral UE assist required with slight contralateral hip drop; Infrapatellar pain 3/10  3.  Patient will demonstrate ability to jog at self-selected pace x5 minutes on treadmill with a symmetrical stride without pain to improve ease with return back to recreation.   At PN: Remains, NT per protocol/patient    ASSESSMENT/RECOMMENDATIONS:    Natalie Nickerson has demonstrated good progress towards created therapeutic goals post-surgically with left knee AROM 2-0-140 degrees and supine SLR x20 without quadriceps lag. Patient does continue to demonstrate poor eccentric quadriceps control as noted with performance of forward heel taps, step downs and squatting. With continued, although improved, discomfort localized to the patellar tendon with continued monitoring. With closed-chain strengthening with greater instability noted within the frontal plane v. Sagittal plane. With prominent hypertonicity of the hamstring musculature bilaterally.        Patient will continue to benefit from skilled PT services to modify and progress therapeutic interventions, address functional mobility deficits, address ROM deficits, address strength deficits, analyze and address soft tissue restrictions, analyze and cue movement patterns, analyze and modify body mechanics/ergonomics, assess and modify postural abnormalities, address imbalance/dizziness and instruct in home and community integration to attain remaining goals.     [x]Continue therapy per initial plan/protocol at a frequency of  1-2 x per week for 6 weeks  []Continue therapy with the following recommended changes:_____________________      _____________________________________________________________________  []Discontinue therapy progressing towards or have reached established goals  []Discontinue therapy due to lack of appreciable progress towards goals  []Discontinue therapy due to lack of attendance or compliance  []Await Physician's recommendations/decisions regarding therapy  []Other:________________________________________________________________    Thank you for this referral.    Roberto Villanueva, PT 4/4/2022 9:06 AM  NOTE TO PHYSICIAN:  PLEASE COMPLETE THE ORDERS BELOW AND   FAX TO [facility]: [phone]  If you are unable to process this request in 24 hours please contact our office: [phone]    []  I have read the above report and request that my patient continue as recommended. []  I have read the above report and request that my patient continue therapy with the following changes/special instructions:________________________________________  []I have read the above report and request that my patient be discharged from therapy.     Physician's Signature:____________Date:_________TIME:________     Myesha Ramsey  ** Signature, Date and Time must be completed for valid certification **

## 2022-04-12 ENCOUNTER — HOSPITAL ENCOUNTER (OUTPATIENT)
Dept: PHYSICAL THERAPY | Age: 32
Discharge: HOME | End: 2022-04-12
Payer: COMMERCIAL

## 2022-04-12 PROCEDURE — 97112 NEUROMUSCULAR REEDUCATION: CPT

## 2022-04-12 PROCEDURE — 97110 THERAPEUTIC EXERCISES: CPT

## 2022-04-12 NOTE — PROGRESS NOTES
PT DAILY TREATMENT NOTE     Patient Name: Marlen Due  Date:2022  : 1990  [x]  Patient  Verified  Payor: Latia Edwards / [place]: [place] [employer] / Product Type: PPO /    In time:200  Out time:246  Total Treatment Time (min): 46  Visit #: 1 of 12    Treatment Area: Left knee pain [M25.562]    SUBJECTIVE  Pain Level (0-10 scale): 0  Any medication changes, allergies to medications, adverse drug reactions, diagnosis change, or new procedure performed?: [x] No    [] Yes (see summary sheet for update)  Subjective functional status/changes:   [] No changes reported  Patient reports noting generalized soreness to the left knee with onset 2 days ago but with patient unsure regarding reason for increased soreness. Patient does as well report 3 episodes of \"giving out\" of his knee without specific aggravating factors.      OBJECTIVE    20 min Therapeutic Exercise:  [x]????????? See flow sheet : Emphasis placed on improving available left knee AROM and LE strength   Rationale: increase ROM and increase strength to improve the patients ability to improve ease with household ADLs     26 min Neuromuscular Re-education:  [x]?????????  See flow sheet : Emphasis placed on improving activation and recruitment of the quadriceps and gluteal musculature and improving LE proprioceptive and kinesthetic awareness   Rationale: increase ROM, increase strength, improve balance and increase proprioception  to improve the patients ability to improve ease with transition to ambulation       With   [] TE   [] TA   [] neuro   [] other: Patient Education: [x] Review HEP    [] Progressed/Changed HEP based on:   [] positioning   [] body mechanics   [] transfers   [] heat/ice application    [] other:      Other Objective/Functional Measures:   Left Knee Examination: (-) Lachman's Test, (-) Anterior Drawer, Left Knee AROM 0-130 deg, TTP Biceps femoris distal tendon, Non-pitting edema noted to kya-inferior knee     *With required modification of exercises performed secondary to acute symptoms and weakness of the left knee. To assess patient further next treatment session with progression in accordance to presentation. Pain Level (0-10 scale) post treatment: 0    ASSESSMENT/Changes in Function: With symmetrical and normal ligamentous testing noted with (-) anterior drawer and (-) lachman's test. With palpation with forward step-up with noted \"popping\" of the medial hamstring tendon at point of terminal knee extension with therapist questioning occurrence secondary to loss of end-range extension neuromuscular control. With greater emphasis placed on promotion of quadriceps-hamstring muscular balance to promote control of end-range extension. Patient provided with updated HEP, specifically to address hamstring-quadriceps muscular imbalance, and educated to apply ice to the left knee with potential short-term use of hinged bracing if symptoms persist/worsen. Patient educated to contact clinic if symptoms worsen with patient to be further evaluated next treatment session. Patient will continue to benefit from skilled PT services to modify and progress therapeutic interventions, address functional mobility deficits, address ROM deficits, address strength deficits, analyze and address soft tissue restrictions, analyze and cue movement patterns, analyze and modify body mechanics/ergonomics, assess and modify postural abnormalities, address imbalance/dizziness and instruct in home and community integration to attain remaining goals. []  See Plan of Care  []  See progress note/recertification  []  See Discharge Summary         Progress towards goals / Updated goals:    Updated Goals: to be achieved in 4 weeks:  1. Patient will demonstrate ability to perform SL sit-to-stand from 18\" box x10 repetitions with correct form and without UE assist to improve ease ease with recreation.   At PN: Remains, SL Sit-to-Stand (24\" Box) x5 repetitions = Single UE assist required with verbal cuing needed to correct form  Current: Remains, SL Sit-to-Stand (24\" Box) x5 repetitions = Single UE assist required with verbal cuing needed to correct form, 4/12/2022  2. Patient will demonstrate ability to perform left LE 6\" forward heel tap x5 repetitions with correct form and without pain provocation without UE assist to demonstrate improved ease and safety with stair management. At PN: Progressing, Left LE 4\" Forward Heel Tap x10 Repetitions = Contralateral UE assist required with slight contralateral hip drop; Infrapatellar pain 3/10  3.  Patient will demonstrate ability to jog at self-selected pace x5 minutes on treadmill with a symmetrical stride without pain to improve ease with return back to recreation.   At PN: Remains, NT per protocol/patient    PLAN  [x]  Upgrade activities as tolerated     [x]  Continue plan of care  []  Update interventions per flow sheet       []  Discharge due to:_  []  Other:_      Luisa Thomas, PT 4/12/2022  7:05 AM    Future Appointments   Date Time Provider Sofía Rodriguez   4/12/2022  2:00 PM Leodan Silva [place] [facility]   4/19/2022  2:00 PM Mind[place], [address] [facility]   4/26/2022  2:00 PM [facility]   5/3/2022  2:00 PM [facility]

## 2022-04-19 ENCOUNTER — HOSPITAL ENCOUNTER (OUTPATIENT)
Dept: PHYSICAL THERAPY | Age: 32
Discharge: HOME | End: 2022-04-19
Payer: COMMERCIAL

## 2022-04-19 PROCEDURE — 97112 NEUROMUSCULAR REEDUCATION: CPT

## 2022-04-19 PROCEDURE — 97110 THERAPEUTIC EXERCISES: CPT

## 2022-04-19 NOTE — PROGRESS NOTES
PT DAILY TREATMENT NOTE     Patient Name: Joy Farris  Date:2022  : 1990  [x]  Patient  Verified  Payor: [employer] / [place]: [place] [employer] / Product Type: PPO /    In time:200  Out time:241  Total Treatment Time (min): 41  Visit #: 2 of 12    Treatment Area: Left knee pain [M25.562]    SUBJECTIVE  Pain Level (0-10 scale): 0  Any medication changes, allergies to medications, adverse drug reactions, diagnosis change, or new procedure performed?: [x] No    [] Yes (see summary sheet for update)  Subjective functional status/changes:   [] No changes reported  Patient reports symptoms are overall improved in comparison to last visit with patient reporting using his hinged knee brace for 2 days and completing the updated prescribed HEP with reduced pain noted but continuation of posteromedial \"popping with terminal knee extension.      OBJECTIVE    15 min Therapeutic Exercise:  [x]?????????? See flow sheet : Emphasis placed on improving available left knee AROM and LE strength   Rationale: increase ROM and increase strength to improve the patients ability to improve ease with household ADLs     26 min Neuromuscular Re-education:  [x]??????????  See flow sheet : Emphasis placed on improving activation and recruitment of the quadriceps and gluteal musculature and improving LE proprioceptive and kinesthetic awareness   Rationale: increase ROM, increase strength, improve balance and increase proprioception  to improve the patients ability to improve ease with transition to ambulation        With   [] TE   [] TA   [] neuro   [] other: Patient Education: [x] Review HEP    [] Progressed/Changed HEP based on:   [] positioning   [] body mechanics   [] transfers   [] heat/ice application    [] other:      Other Objective/Functional Measures: Poor quadriceps eccentric control with eccentric 2\" forward step down     Pain Level (0-10 scale) post treatment: 0    ASSESSMENT/Changes in Function: With overall improvement in symptoms with reduction in acute hamstring irritation but continued prominent quadriceps weakness with altered patellofemoral mechanics. With continued emphasis placed on promotion of return of muscular balance between the hamstring and quadriceps musculature to improve static and dynamic joint stability. Patient will continue to benefit from skilled PT services to modify and progress therapeutic interventions, address functional mobility deficits, address ROM deficits, address strength deficits, analyze and address soft tissue restrictions, analyze and cue movement patterns, analyze and modify body mechanics/ergonomics, assess and modify postural abnormalities, address imbalance/dizziness and instruct in home and community integration to attain remaining goals. []  See Plan of Care  []  See progress note/recertification  []  See Discharge Summary         Progress towards goals / Updated goals:    Updated Goals: to be achieved in 4 weeks:  1. Patient will demonstrate ability to perform SL sit-to-stand from 18\" box x10 repetitions with correct form and without UE assist to improve ease ease with recreation. At PN: Remains, SL Sit-to-Stand (24\" Box) x5 repetitions = Single UE assist required with verbal cuing needed to correct form  Current: Remains, SL Sit-to-Stand (24\" Box) x5 repetitions = Single UE assist required with verbal cuing needed to correct form, 4/19/2022  2. Patient will demonstrate ability to perform left LE 6\" forward heel tap x5 repetitions with correct form and without pain provocation without UE assist to demonstrate improved ease and safety with stair management. At PN: Progressing, Left LE 4\" Forward Heel Tap x10 Repetitions = Contralateral UE assist required with slight contralateral hip drop; Infrapatellar pain 3/10  3.  Patient will demonstrate ability to jog at self-selected pace x5 minutes on treadmill with a symmetrical stride without pain to improve ease with return back to recreation.   At PN: Remains, NT per protocol/patient    PLAN  []  Upgrade activities as tolerated     []  Continue plan of care  []  Update interventions per flow sheet       []  Discharge due to:_  []  Other:_      Julienne, GIANCARLO 4/19/2022  7:30 AM    Future Appointments   Date Time Provider Sofía Rodriguez   4/19/2022  2:00 PM Renato Martin [facility]   4/26/2022  2:00 PM Renato Martin [facility]   5/3/2022  2:00 PM Leodan Jimenez [facility]

## 2022-04-26 ENCOUNTER — HOSPITAL ENCOUNTER (OUTPATIENT)
Dept: PHYSICAL THERAPY | Age: 32
Discharge: HOME | End: 2022-04-26
Payer: COMMERCIAL

## 2022-04-26 PROCEDURE — 97110 THERAPEUTIC EXERCISES: CPT

## 2022-04-26 PROCEDURE — 97112 NEUROMUSCULAR REEDUCATION: CPT

## 2022-04-26 PROCEDURE — 97140 MANUAL THERAPY 1/> REGIONS: CPT

## 2022-04-26 NOTE — PROGRESS NOTES
PT DAILY TREATMENT NOTE     Patient Name: Ronald Porter  Date:2022  : 1990  [x]  Patient  Verified  Payor: Chichi Silva / [place]: [place] [employer] / Product Type: PPO /    In time:159  Out time:251  Total Treatment Time (min): 52  Visit #: 3 of 12    Treatment Area: Left knee pain [M25.562]    SUBJECTIVE  Pain Level (0-10 scale): 0  Any medication changes, allergies to medications, adverse drug reactions, diagnosis change, or new procedure performed?: [x] No    [] Yes (see summary sheet for update)  Subjective functional status/changes:   [] No changes reported  Patient reports noting that the hamstring \"popping\" is less with patient denying episodes of knee instability. OBJECTIVE     15 min Therapeutic Exercise:  [x]??????????? See flow sheet : Emphasis placed on improving available left knee AROM and LE strength   Rationale: increase ROM and increase strength to improve the patients ability to improve ease with household ADLs     29 min Neuromuscular Re-education:  [x]???????????  See flow sheet : Emphasis placed on improving activation and recruitment of the quadriceps and gluteal musculature and improving LE proprioceptive and kinesthetic awareness   Rationale: increase ROM, increase strength, improve balance and increase proprioception  to improve the patients ability to improve ease with transition to ambulation       8 min Manual Therapy:    Supine, Left Patellar Superior Grade III-IV Mobilization  Supine, Left Patellar Superior Don Tape   The manual therapy interventions were performed at a separate and distinct time from the therapeutic activities interventions. Rationale: decrease pain, increase ROM and increase tissue extensibility to improve ease with stair management.       With   [] TE   [] TA   [] neuro   [] other: Patient Education: [x] Review HEP    [] Progressed/Changed HEP based on:   [] positioning   [] body mechanics   [] transfers   [] heat/ice application    [] other: Other Objective/Functional Measures:   Left LE 2\" Forward Heel Tap x10 Repetitions = Correct form, No pain provocation     Pain Level (0-10 scale) post treatment: 0     ASSESSMENT/Changes in Function: Trial superior Don taping with continued questioning of presence of patellofemoral pain syndrome secondary to altered patellofemoral mechanics due to continued quadriceps weakness. With overall reduction in medial hamstring pain with improved quadriceps concentric and eccentric control noted. Patient will continue to benefit from skilled PT services to modify and progress therapeutic interventions, address functional mobility deficits, address ROM deficits, address strength deficits, analyze and address soft tissue restrictions, analyze and cue movement patterns, analyze and modify body mechanics/ergonomics, assess and modify postural abnormalities, address imbalance/dizziness and instruct in home and community integration to attain remaining goals. []  See Plan of Care  []  See progress note/recertification  []  See Discharge Summary         Progress towards goals / Updated goals:    Updated Goals: to be achieved in 4 weeks:  1. Patient will demonstrate ability to perform SL sit-to-stand from 18\" box x10 repetitions with correct form and without UE assist to improve ease ease with recreation. At PN: Remains, SL Sit-to-Stand (24\" Box) x5 repetitions = Single UE assist required with verbal cuing needed to correct form  Current: Remains, SL Sit-to-Stand (24\" Box) x5 repetitions = Single UE assist required with verbal cuing needed to correct form, 4/19/2022  2. Patient will demonstrate ability to perform left LE 6\" forward heel tap x5 repetitions with correct form and without pain provocation without UE assist to demonstrate improved ease and safety with stair management.   At PN: Progressing, Left LE 4\" Forward Heel Tap x10 Repetitions = Contralateral UE assist required with slight contralateral hip drop; Infrapatellar pain 3/10  Current: Progressing, Left LE 2\" Forward Heel Tap x10 Repetitions = Correct form, No pain provocation, 4/26/2022  3.  Patient will demonstrate ability to jog at self-selected pace x5 minutes on treadmill with a symmetrical stride without pain to improve ease with return back to recreation.   At PN: Remains, NT per protocol/patient    PLAN  [x]  Upgrade activities as tolerated     [x]  Continue plan of care  []  Update interventions per flow sheet       []  Discharge due to:_  []  Other:_      Krysta Brambila PT 4/26/2022  7:31 AM    Future Appointments   Date Time Provider Sofía Rodriguez   4/26/2022  2:00 PM Renato Das [facility]   5/3/2022  2:00 PM Nayely Hagen PT [facility]

## 2022-05-03 ENCOUNTER — HOSPITAL ENCOUNTER (OUTPATIENT)
Dept: PHYSICAL THERAPY | Age: 32
Discharge: HOME | End: 2022-05-03
Payer: COMMERCIAL

## 2022-05-03 PROCEDURE — 97140 MANUAL THERAPY 1/> REGIONS: CPT

## 2022-05-03 PROCEDURE — 97112 NEUROMUSCULAR REEDUCATION: CPT

## 2022-05-03 PROCEDURE — 97110 THERAPEUTIC EXERCISES: CPT

## 2022-05-03 NOTE — PROGRESS NOTES
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 Patient will demonstrate ability to perform SL sit-to-stand from 18\" box x10 repetitions with correct form and without UE assist to improve ease ease with recreation. At PN: Remains, SL Sit-to-Stand (24\" Box) x5 repetitions = Single UE assist required with verbal cuing needed to correct form  Current: Remains, SL Sit-to-Stand (24\" Box) x5 repetitions = Single UE assist required with verbal cuing needed to correct form  2. Patient will demonstrate ability to perform left LE 6\" forward heel tap x5 repetitions with correct form and without pain provocation without UE assist to demonstrate improved ease and safety with stair management. At PN: Progressing, Left LE 4\" Forward Heel Tap x10 Repetitions = Contralateral UE assist required with slight contralateral hip drop; Infrapatellar pain 3/10  Current: Progressing, Left LE 2\" Forward Heel Tap x10 Repetitions = Correct form, No pain provocation  3.  Patient will demonstrate ability to jog at self-selected pace x5 minutes on treadmill with a symmetrical stride without pain to improve ease with return back to recreation.   At PN: Remains, NT per protocol/patient Current: Remains, NT secondary to continued strength loss    Key Functional Changes: See goals above. Updated Goals: to be achieved in 4 weeks:  1. Patient will demonstrate ability to perform SL sit-to-stand from 18\" box x10 repetitions with correct form and without UE assist to improve ease ease with recreation. At [facility] 45 (24\" Box) x5 repetitions = Single UE assist required with verbal cuing needed to correct form  2. Patient will demonstrate ability to perform left LE 6\" forward heel tap x5 repetitions with correct form and without pain provocation without UE assist to demonstrate improved ease and safety with stair management. At PN: Progressing, Left LE 2\" Forward Heel Tap x10 Repetitions = Correct form, No pain provocation  3. Patient will demonstrate ability to jog at self-selected pace x5 minutes on treadmill with a symmetrical stride without pain to improve ease with return back to recreation.   At PN: Remains, NT secondary to continued strength loss  4. Patient will demonstrate ability to perform single-limb hop distance >/= 80% of the contralateral limb to ensure ease with return back to running. At PN: NT    ASSESSMENT/RECOMMENDATIONS:    Patient continues to progress post-surgically, but noted overall to demonstrate slower progression than that expected per post-surgical protocol guidelines with therapist correlating to continued diminished quadriceps strength with noted hamstring-quadriceps muscular imbalance.  Despite slow progression, with gradual objective and subjective improvement in quadriceps strength with ability to progress within clinic with emphasis placed on return of hamstring-quadriceps muscular imbalance with progression towards return-to-run as appropriate per post-surgical protocol.     Patient will continue to benefit from skilled PT services to modify and progress therapeutic interventions, address functional mobility deficits, address ROM deficits, address strength deficits, analyze and address soft tissue restrictions, analyze and cue movement patterns, analyze and modify body mechanics/ergonomics, assess and modify postural abnormalities, address imbalance/dizziness and instruct in home and community integration to attain remaining goals. [x]Continue therapy per initial plan/protocol at a frequency of  1-2 x per week for 4 weeks  []Continue therapy with the following recommended changes:_____________________      _____________________________________________________________________  []Discontinue therapy progressing towards or have reached established goals  []Discontinue therapy due to lack of appreciable progress towards goals  []Discontinue therapy due to lack of attendance or compliance  []Await Physician's recommendations/decisions regarding therapy  []Other:________________________________________________________________    Thank you for this referral.    Macario Beltran, PT 5/3/2022 7:07 AM  NOTE TO PHYSICIAN:  PLEASE COMPLETE THE ORDERS BELOW AND   FAX TO [facility]: [phone]  If you are unable to process this request in 24 hours please contact our office: [phone]    []  I have read the above report and request that my patient continue as recommended. []  I have read the above report and request that my patient continue therapy with the following changes/special instructions:________________________________________  []I have read the above report and request that my patient be discharged from therapy.     Physician's Signature:____________Date:_________TIME:________     Prakash Cools  ** Signature, Date and Time must be completed for valid certification **

## 2022-05-03 NOTE — PROGRESS NOTES
PT DAILY TREATMENT NOTE     Patient Name: Derry Lombard  Date:5/3/2022  : 1990  [x]  Patient  Verified  Payor: Cory Hendricks / Plan: [place] [employer] [place] / Product Type: PPO /    In time:200  Out time:250  Total Treatment Time (min): 50  Visit #: 4 of 12    Treatment Area: Left knee pain [M25.562]    SUBJECTIVE  Pain Level (0-10 scale): 2  Any medication changes, allergies to medications, adverse drug reactions, diagnosis change, or new procedure performed?: [x] No    [] Yes (see summary sheet for update)  Subjective functional status/changes:   [] No changes reported  Patient reports last week swinging his leg out of the trunk when he \"tweaked\" his knee with patient reporting some swelling and difficulty with weightbearing with overall improvement since this time with patient reporting use of compression sleeve secondary to this. OBJECTIVE    17 min Therapeutic Exercise:  [x]???????????? See flow sheet : Emphasis placed on improving available left knee AROM and LE strength   Rationale: increase ROM and increase strength to improve the patients ability to improve ease with household ADLs     25 min Neuromuscular Re-education:  [x]????????????  See flow sheet : Emphasis placed on improving activation and recruitment of the quadriceps and gluteal musculature and improving LE proprioceptive and kinesthetic awareness   Rationale: increase ROM, increase strength, improve balance and increase proprioception  to improve the patients ability to improve ease with transition to ambulation                                        8 min Manual Therapy:    Supine, Left Patellar Superior Grade III-IV Mobilization   The manual therapy interventions were performed at a separate and distinct time from the therapeutic activities interventions. Rationale: decrease pain, increase ROM and increase tissue extensibility to improve ease with stair management.          With   [] TE   [] TA   [] neuro   [] other: Patient Education: [x] Review HEP    [] Progressed/Changed HEP based on:   [] positioning   [] body mechanics   [] transfers   [] heat/ice application    [] other:      Other Objective/Functional Measures: See goals below. Pain Level (0-10 scale) post treatment: 0    ASSESSMENT/Changes in Function: Patient continues to progress post-surgically, but noted overall to demonstrate slower progression than that expected per post-surgical protocol guidelines with therapist correlating to continued diminished quadriceps strength with noted hamstring-quadriceps muscular imbalance. Despite slow progression, with gradual objective and subjective improvement in quadriceps strength with ability to progress within clinic with emphasis placed on return of hamstring-quadriceps muscular imbalance with progression towards return-to-run as appropriate per post-surgical protocol. Patient will continue to benefit from skilled PT services to modify and progress therapeutic interventions, address functional mobility deficits, address ROM deficits, address strength deficits, analyze and address soft tissue restrictions, analyze and cue movement patterns, analyze and modify body mechanics/ergonomics, assess and modify postural abnormalities, address imbalance/dizziness and instruct in home and community integration to attain remaining goals. []  See Plan of Care  [x]  See progress note/recertification  []  See Discharge Summary         Progress towards goals / Updated goals:    Updated Goals: to be achieved in 4 weeks:  1. Patient will demonstrate ability to perform SL sit-to-stand from 18\" box x10 repetitions with correct form and without UE assist to improve ease ease with recreation. At PN: Remains, SL Sit-to-Stand (24\" Box) x5 repetitions = Single UE assist required with verbal cuing needed to correct form  Current: Remains, SL Sit-to-Stand (24\" Box) x5 repetitions = Single UE assist required with verbal cuing needed to correct form, 4/19/2022  2. Patient will demonstrate ability to perform left LE 6\" forward heel tap x5 repetitions with correct form and without pain provocation without UE assist to demonstrate improved ease and safety with stair management. At PN: Progressing, Left LE 4\" Forward Heel Tap x10 Repetitions = Contralateral UE assist required with slight contralateral hip drop; Infrapatellar pain 3/10  Current: Progressing, Left LE 2\" Forward Heel Tap x10 Repetitions = Correct form, No pain provocation, 4/26/2022  3. Patient will demonstrate ability to jog at self-selected pace x5 minutes on treadmill with a symmetrical stride without pain to improve ease with return back to recreation.   At PN: Remains, NT per protocol/patient   Current: Remains, NT secondary to continued strength loss, 5/3/2022    Updated Goals: To be completed in 4 weeks. 1. Patient will demonstrate ability to perform single-limb hop distance >/= 80% of the contralateral limb to ensure ease with return back to running.   5/3/2022: NT    PLAN  [x]  Upgrade activities as tolerated     [x]  Continue plan of care  []  Update interventions per flow sheet       []  Discharge due to:_  []  Other:_      Audrey Mariscal PT 5/3/2022  7:06 AM    Future Appointments   Date Time Provider Sofía Rodriguez   5/3/2022  2:00 PM Renato [facility]   5/17/2022  2:00 PM Mindy, [address] [facility]   5/31/2022  [facility]   6/14/2022  2:00 [facility]

## 2022-05-17 ENCOUNTER — HOSPITAL ENCOUNTER (OUTPATIENT)
Dept: PHYSICAL THERAPY | Age: 32
Discharge: HOME | End: 2022-05-17
Payer: COMMERCIAL

## 2022-05-17 PROCEDURE — 97112 NEUROMUSCULAR REEDUCATION: CPT

## 2022-05-17 PROCEDURE — 97110 THERAPEUTIC EXERCISES: CPT

## 2022-05-17 NOTE — PROGRESS NOTES
PT DAILY TREATMENT NOTE     Patient Name: Laila Downey  Date:2022  : 1990  [x]  Patient  Verified  Payor: Sherice Block / [place]: [place] [employer] [place] / Product Type: PPO /    In time:200  Out time:244  Total Treatment Time (min): 44  Visit #: 1 of 8    Treatment Area: Left knee pain [M25.562]    SUBJECTIVE  Pain Level (0-10 scale): 0  Any medication changes, allergies to medications, adverse drug reactions, diagnosis change, or new procedure performed?: [x] No    [] Yes (see summary sheet for update)  Subjective functional status/changes:   [] No changes reported  Patient reports non-use of hinged knee brace for >1 week. Patient reports no episodes of instability. OBJECTIVE    10 min Therapeutic Exercise:  [x]????????????? See flow sheet : Emphasis placed on improving available left knee AROM and LE strength   Rationale: increase ROM and increase strength to improve the patients ability to improve ease with household ADLs     34 min Neuromuscular Re-education:  []?????????????  See flow sheet : Emphasis placed on improving activation and recruitment of the quadriceps and gluteal musculature and improving LE proprioceptive and kinesthetic awareness   Rationale: increase ROM, increase strength, improve balance and increase proprioception  to improve the patients ability to improve ease with transition to ambulation                                        With   [] TE   [] TA   [] neuro   [] other: Patient Education: [x] Review HEP    [] Progressed/Changed HEP based on:   [] positioning   [] body mechanics   [] transfers   [] heat/ice application    [] other:      Other Objective/Functional Measures: Slight asymmetry noted with wall squat with jump with decreased balance self-confidence.      Pain Level (0-10 scale) post treatment: 0    ASSESSMENT/Changes in Function: With continued emphasis placed on promotion of quadriceps-hamstring muscular balance with ability to progress to performance of light plyometrics with good tolerance. With initiation of light plyometrics with emphasis placed on symmetrical LE performance and correct landing mechanics. Patient advised to add single-limb sit-to-stands to current HEP. Patient will continue to benefit from skilled PT services to modify and progress therapeutic interventions, address functional mobility deficits, address ROM deficits, address strength deficits, analyze and address soft tissue restrictions, analyze and cue movement patterns, analyze and modify body mechanics/ergonomics, assess and modify postural abnormalities, address imbalance/dizziness and instruct in home and community integration to attain remaining goals. []  See Plan of Care  []  See progress note/recertification  []  See Discharge Summary         Progress towards goals / Updated goals:     Updated Goals: to be achieved in 4 weeks:  1. Patient will demonstrate ability to perform SL sit-to-stand from 18\" box x10 repetitions with correct form and without UE assist to improve ease ease with recreation. At [facility] 45 (24\" Box) x5 repetitions = Single UE assist required with verbal cuing needed to correct form  2. Patient will demonstrate ability to perform left LE 6\" forward heel tap x5 repetitions with correct form and without pain provocation without UE assist to demonstrate improved ease and safety with stair management. At PN: Progressing, Left LE 2\" Forward Heel Tap x10 Repetitions = Correct form, No pain provocation  Current: Remains, Left LE 2\" Forward Heel Tap x10 Repetitions = Correct form, No pain provocation, 5/17/2022  3. Patient will demonstrate ability to jog at self-selected pace x5 minutes on treadmill with a symmetrical stride without pain to improve ease with return back to recreation.   At PN: Remains, NT secondary to continued strength loss  4.  Patient will demonstrate ability to perform single-limb hop distance >/= 80% of the contralateral limb to ensure ease with return back to running.   At PN: NT    PLAN  [x]  Upgrade activities as tolerated     [x]  Continue plan of care  []  Update interventions per flow sheet       []  Discharge due to:_  []  Other:_      [place] Duty, SPT    Adrianna Díaz, PT 5/17/2022  7:53 AM    Future Appointments   Date Time Provider Sofía Rodriguez   5/17/2022  2:00 PM iGna [place] [facility]   5/31/2022  2:00 PM Gina [place] [facility]   6/14/2022  2:00 PM Gina [place], PT [facility]

## 2022-05-31 ENCOUNTER — HOSPITAL ENCOUNTER (OUTPATIENT)
Dept: PHYSICAL THERAPY | Age: 32
Discharge: HOME | End: 2022-05-31
Payer: COMMERCIAL

## 2022-05-31 PROCEDURE — 97112 NEUROMUSCULAR REEDUCATION: CPT

## 2022-05-31 PROCEDURE — 97110 THERAPEUTIC EXERCISES: CPT

## 2022-05-31 NOTE — PROGRESS NOTES
[facility]              [address]        [place], [address] [place]   [phone] fax    Progress Note  Patient name: Moises Padilla Start of Care: 2022   Referral source: James Ames,* : 1990   Medical/Treatment Diagnosis: Left knee pain [M25.562]  Payor: Sebastian Taylorro / Plan: [place] [employer] / Product Type: PPO /  Onset Date:  DoS 2021  DoI 2021     Prior Hospitalization: see medical history Provider#: [id number]   Medications: Verified on Patient Summary List     Comorbidities: Left Knee ACL Reconstruction x2 (, 2021)   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 Patient will demonstrate ability to perform SL sit-to-stand from 18\" box x10 repetitions with correct form and without UE assist to improve ease ease with recreation. At [facility] 45 (24\" Box) x5 repetitions = Single UE assist required with verbal cuing needed to correct form  'Current: Progressing, SL Sit-to-Stand (18\" Box) x10 Repetitions = No UE assistance required with performance without UE assist, Slight functional valgus  2. Patient will demonstrate ability to perform left LE 6\" forward heel tap x5 repetitions with correct form and without pain provocation without UE assist to demonstrate improved ease and safety with stair management. At PN: Progressing, Left LE 2\" Forward Heel Tap x10 Repetitions = Correct form, No pain provocation  Current: Remains, Left LE 2\" Forward Heel Tap x10 Repetitions = Correct form, No pain provocation  3.  Patient will demonstrate ability to jog at self-selected pace x5 minutes on treadmill with a symmetrical stride without pain to improve ease with return back to recreation.   At PN: Remains, NT secondary to continued strength loss  Current: Remains, NT secondary to continued strength loss  4. Patient will demonstrate ability to perform single-limb hop distance >/= 80% of the contralateral limb to ensure ease with return back to running. At PN: NT  Current: Progressing, Single-Limb Hop Distance = 10% of Contralateral Limb    Key Functional Changes: See goals above. Updated Goals: to be achieved in 4 weeks:  1. Patient will demonstrate ability to perform SL sit-to-stand from 18\" box x10 repetitions with correct form and without UE assist to improve ease ease with recreation. At PN: Progressing, SL Sit-to-Stand (18\" Box) x10 Repetitions = No UE assistance required with performance without UE assist, Slight functional valgus  2. Patient will demonstrate ability to perform left LE 6\" forward heel tap x5 repetitions with correct form and without pain provocation without UE assist to demonstrate improved ease and safety with stair management. At PN: Remains, Left LE 2\" Forward Heel Tap x10 Repetitions = Correct form, No pain provocation  3. Patient will demonstrate ability to jog at self-selected pace x5 minutes on treadmill with a symmetrical stride without pain to improve ease with return back to recreation.   At PN: Remains, NT secondary to continued strength loss  4. Patient will demonstrate ability to perform single-limb hop distance >/= 80% of the contralateral limb to ensure ease with return back to running. At PN: Progressing, Single-Limb Hop Distance = 10% of Contralateral Limb    ASSESSMENT/RECOMMENDATIONS:    Patient has been seen at low frequency, 1x every 2 weeks, with emphasis placed on prescription of a progressive HEP with focus placed on improving quadriceps-hamstring muscular balance to enable successful transition to plyometric regime and return to exercise. With continued, although significantly improved, quadriceps weakness thus with slow progression post-surgically to avoid hamstring irritation.  To continue to progress per plan of care at low frequency with goal of returning patient back to previous exercise regime as appropriate and able.      Patient will continue to benefit from skilled PT services to modify and progress therapeutic interventions, address functional mobility deficits, address ROM deficits, address strength deficits, analyze and address soft tissue restrictions, analyze and cue movement patterns, analyze and modify body mechanics/ergonomics, assess and modify postural abnormalities, address imbalance/dizziness and instruct in home and community integration to attain remaining goals. [x]Continue therapy per initial plan/protocol at a frequency of  2 x per week for 4 weeks  []Continue therapy with the following recommended changes:_____________________      _____________________________________________________________________  []Discontinue therapy progressing towards or have reached established goals  []Discontinue therapy due to lack of appreciable progress towards goals  []Discontinue therapy due to lack of attendance or compliance  []Await Physician's recommendations/decisions regarding therapy  []Other:________________________________________________________________    Thank you for this referral.    Jade Allen, PT 5/31/2022 7:07 AM  NOTE TO PHYSICIAN:  PLEASE COMPLETE THE ORDERS BELOW AND   FAX TO [facility]: [phone]  If you are unable to process this request in 24 hours please contact our office: [phone]    []  I have read the above report and request that my patient continue as recommended. []  I have read the above report and request that my patient continue therapy with the following changes/special instructions:________________________________________  []I have read the above report and request that my patient be discharged from therapy.     Physician's Signature:____________Date:_________TIME:________     Liana Corrales  ** Signature, Date and Time must be completed for valid certification **

## 2022-05-31 NOTE — PROGRESS NOTES
PT DAILY TREATMENT NOTE     Patient Name: Bhumi Jackson  Date:2022  : 1990  [x]  Patient  Verified  Payor: Anni Pride / Plan: [place] [employer] PPO / Product Type: PPO /    In time:200  Out time:240  Total Treatment Time (min): 40  Visit #: 2 of 8    Treatment Area: Left knee pain [M25.562]    SUBJECTIVE  Pain Level (0-10 scale): 0  Any medication changes, allergies to medications, adverse drug reactions, diagnosis change, or new procedure performed?: [x] No    [] Yes (see summary sheet for update)  Subjective functional status/changes:   [] No changes reported  Patient reports trial of some gentle jumping with good tolerance. Patient reports some intermittent episodes of hamstring \"popping\" with quadriceps fatigue. OBJECTIVE    10 min Therapeutic Exercise:  [x]?????????????? See flow sheet : Emphasis placed on improving available left knee AROM and LE strength   Rationale: increase ROM and increase strength to improve the patients ability to improve ease with household ADLs     30 min Neuromuscular Re-education:  []??????????????  See flow sheet : Emphasis placed on improving activation and recruitment of the quadriceps and gluteal musculature and improving LE proprioceptive and kinesthetic awareness   Rationale: increase ROM, increase strength, improve balance and increase proprioception  to improve the patients ability to improve ease with transition to ambulation        With   [] TE   [] TA   [] neuro   [] other: Patient Education: [x] Review HEP    [] Progressed/Changed HEP based on:   [] positioning   [] body mechanics   [] transfers   [] heat/ice application    [] other:      Other Objective/Functional Measures: See goals below.       Pain Level (0-10 scale) post treatment: 0    ASSESSMENT/Changes in Function: Patient has been seen at low frequency, 1x every 2 weeks, with emphasis placed on prescription of a progressive HEP with focus placed on improving quadriceps-hamstring muscular balance to enable successful transition to plyometric regime and return to exercise. With continued, although significantly improved, quadriceps weakness thus with slow progression post-surgically to avoid hamstring irritation. To continue to progress per plan of care at low frequency with goal of returning patient back to previous exercise regime as appropriate and able. Patient will continue to benefit from skilled PT services to modify and progress therapeutic interventions, address functional mobility deficits, address ROM deficits, address strength deficits, analyze and address soft tissue restrictions, analyze and cue movement patterns, analyze and modify body mechanics/ergonomics, assess and modify postural abnormalities, address imbalance/dizziness and instruct in home and community integration to attain remaining goals. []  See Plan of Care  [x]  See progress note/recertification  []  See Discharge Summary         Progress towards goals / Updated goals:    Updated Goals: to be achieved in 4 weeks:  1. Patient will demonstrate ability to perform SL sit-to-stand from 18\" box x10 repetitions with correct form and without UE assist to improve ease ease with recreation. At [facility] 45 (24\" Box) x5 repetitions = Single UE assist required with verbal cuing needed to correct form  'Current: Progressing, SL Sit-to-Stand (18\" Box) x10 Repetitions = No UE assistance required with performance without UE assist, Slight functional valgus, 5/31/2022  2. Patient will demonstrate ability to perform left LE 6\" forward heel tap x5 repetitions with correct form and without pain provocation without UE assist to demonstrate improved ease and safety with stair management. At PN: Progressing, Left LE 2\" Forward Heel Tap x10 Repetitions = Correct form, No pain provocation  Current: Remains, Left LE 2\" Forward Heel Tap x10 Repetitions = Correct form, No pain provocation, 5/17/2022  3.  Patient will demonstrate ability to jog at self-selected pace x5 minutes on treadmill with a symmetrical stride without pain to improve ease with return back to recreation.   At PN: Remains, NT secondary to continued strength loss  Current: Remains, NT secondary to continued strength loss, 5/31/2022  4. Patient will demonstrate ability to perform single-limb hop distance >/= 80% of the contralateral limb to ensure ease with return back to running.   At PN: NT  Current: Progressing, Single-Limb Hop Distance = 10% of Contralateral Limb, 5/31/2022    PLAN  [x]  Upgrade activities as tolerated     [x]  Continue plan of care  []  Update interventions per flow sheet       []  Discharge due to:_  []  Other:_      Leti De La Torre PT 5/31/2022  7:04 AM    Future Appointments   Date Time Provider Sofía Rodriguez   5/31/2022  2:00 PM [facility]   6/14/2022  2:00 PM Elaine Stewart [facility]

## 2022-06-13 ENCOUNTER — TELEPHONE (OUTPATIENT)
Dept: PHYSICAL THERAPY | Age: 32
End: 2022-06-13

## 2022-06-14 ENCOUNTER — APPOINTMENT (OUTPATIENT)
Dept: PHYSICAL THERAPY | Age: 32
End: 2022-06-14

## 2022-06-22 NOTE — PROGRESS NOTES
[facility]              [address]        [place] [address] [place]   [phone] fax    Discharge Summary  Patient name: Angie Willard Start of Care: 2022   Referral source: Rena Torres,* : 1990   Medical/Treatment Diagnosis: Left knee pain [M25.562]  Payor: Ian Meehan / Plan: [place] [employer] PPO / Product Type: PPO /  Onset Date:  DoS 2021  DoI 2021     Prior Hospitalization: see medical history Provider#: [id number]   Medications: Verified on Patient Summary List    Comorbidities: Left Knee ACL Reconstruction x2 (, 2021)   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 Patient will demonstrate ability to perform SL sit-to-stand from 18\" box x10 repetitions with correct form and without UE assist to improve ease ease with recreation. At PN: Progressing, SL Sit-to-Stand (18\" Box) x10 Repetitions = No UE assistance required with performance without UE assist, Slight functional valgus  At DC: Remains, Inability to assess secondary to non-attendance post-reassessment  2. Patient will demonstrate ability to perform left LE 6\" forward heel tap x5 repetitions with correct form and without pain provocation without UE assist to demonstrate improved ease and safety with stair management. At PN: Remains, Left LE 2\" Forward Heel Tap x10 Repetitions = Correct form, No pain provocation  At DC: Remains, Inability to assess secondary to non-attendance post-reassessment  3.  Patient will demonstrate ability to jog at self-selected pace x5 minutes on treadmill with a symmetrical stride without pain to improve ease with return back to recreation.   At PN: Remains, NT secondary to continued strength loss  At DC: Remains, Inability to assess secondary to non-attendance post-reassessment  4. Patient will demonstrate ability to perform single-limb hop distance >/= 80% of the contralateral limb to ensure ease with return back to running. At PN: Progressing, Single-Limb Hop Distance = 10% of Contralateral Limb  At DC: Remains, Inability to assess secondary to non-attendance post-reassessment    ASSESSMENT/RECOMMENDATIONS:    At this time patient to be discharged in accordance with self-request made via phone conversation with clinic 6/13/2022 secondary to reinitiation of insurance coverage plan year with resultant restart of deductible. Patient to continue prescribed home exercise program independently.      [x]Discontinue therapy: []Patient has reached or is progressing toward set goals      [x]Discharge per insurance      []Due to lack of appreciable progress towards set goals    Matheus Crawford, PT 6/22/2022 2:32 PM

## 2024-10-30 ENCOUNTER — OFFICE VISIT (OUTPATIENT)
Facility: CLINIC | Age: 34
End: 2024-10-30

## 2024-10-30 VITALS
BODY MASS INDEX: 22.8 KG/M2 | TEMPERATURE: 98.1 F | SYSTOLIC BLOOD PRESSURE: 128 MMHG | RESPIRATION RATE: 16 BRPM | OXYGEN SATURATION: 99 % | WEIGHT: 172 LBS | HEIGHT: 73 IN | HEART RATE: 85 BPM | DIASTOLIC BLOOD PRESSURE: 77 MMHG

## 2024-10-30 DIAGNOSIS — Q82.9 SKIN ANOMALY: ICD-10-CM

## 2024-10-30 DIAGNOSIS — E61.1 IRON DEFICIENCY: ICD-10-CM

## 2024-10-30 DIAGNOSIS — E07.9 THYROID DISORDER: ICD-10-CM

## 2024-10-30 DIAGNOSIS — R07.9 CHEST PAIN, UNSPECIFIED TYPE: Primary | ICD-10-CM

## 2024-10-30 DIAGNOSIS — E55.9 HYPOVITAMINOSIS D: ICD-10-CM

## 2024-10-30 DIAGNOSIS — Z00.00 GENERAL MEDICAL EXAM: ICD-10-CM

## 2024-10-30 DIAGNOSIS — R55 PRE-SYNCOPE: ICD-10-CM

## 2024-10-30 DIAGNOSIS — R00.2 PALPITATIONS: ICD-10-CM

## 2024-10-30 DIAGNOSIS — R73.9 HYPERGLYCEMIA: ICD-10-CM

## 2024-10-30 DIAGNOSIS — Z11.59 NEED FOR HEPATITIS C SCREENING TEST: ICD-10-CM

## 2024-10-30 DIAGNOSIS — M25.562 CHRONIC PAIN OF LEFT KNEE: ICD-10-CM

## 2024-10-30 DIAGNOSIS — K58.9 IRRITABLE BOWEL SYNDROME, UNSPECIFIED TYPE: ICD-10-CM

## 2024-10-30 DIAGNOSIS — G89.29 CHRONIC PAIN OF LEFT KNEE: ICD-10-CM

## 2024-10-30 PROBLEM — F41.9 ANXIETY: Status: ACTIVE | Noted: 2024-10-30

## 2024-10-30 PROBLEM — N40.0 PROSTATISM: Status: ACTIVE | Noted: 2024-10-30

## 2024-10-30 PROBLEM — E78.2 MIXED HYPERLIPIDEMIA: Status: ACTIVE | Noted: 2018-08-06

## 2024-10-30 PROBLEM — R43.2 LOSS OF TASTE: Status: ACTIVE | Noted: 2024-10-30

## 2024-10-30 PROBLEM — M25.50 PAIN IN JOINT: Status: ACTIVE | Noted: 2019-09-24

## 2024-10-30 PROBLEM — J02.9 ACUTE PHARYNGITIS: Status: ACTIVE | Noted: 2019-07-09

## 2024-10-30 SDOH — ECONOMIC STABILITY: FOOD INSECURITY: WITHIN THE PAST 12 MONTHS, YOU WORRIED THAT YOUR FOOD WOULD RUN OUT BEFORE YOU GOT MONEY TO BUY MORE.: NEVER TRUE

## 2024-10-30 SDOH — ECONOMIC STABILITY: FOOD INSECURITY: WITHIN THE PAST 12 MONTHS, THE FOOD YOU BOUGHT JUST DIDN'T LAST AND YOU DIDN'T HAVE MONEY TO GET MORE.: NEVER TRUE

## 2024-10-30 SDOH — ECONOMIC STABILITY: INCOME INSECURITY: HOW HARD IS IT FOR YOU TO PAY FOR THE VERY BASICS LIKE FOOD, HOUSING, MEDICAL CARE, AND HEATING?: NOT HARD AT ALL

## 2024-10-30 ASSESSMENT — PATIENT HEALTH QUESTIONNAIRE - PHQ9
SUM OF ALL RESPONSES TO PHQ9 QUESTIONS 1 & 2: 0
1. LITTLE INTEREST OR PLEASURE IN DOING THINGS: NOT AT ALL
SUM OF ALL RESPONSES TO PHQ QUESTIONS 1-9: 0
2. FEELING DOWN, DEPRESSED OR HOPELESS: NOT AT ALL
SUM OF ALL RESPONSES TO PHQ QUESTIONS 1-9: 0

## 2024-10-30 ASSESSMENT — ANXIETY QUESTIONNAIRES
GAD7 TOTAL SCORE: 4
IF YOU CHECKED OFF ANY PROBLEMS ON THIS QUESTIONNAIRE, HOW DIFFICULT HAVE THESE PROBLEMS MADE IT FOR YOU TO DO YOUR WORK, TAKE CARE OF THINGS AT HOME, OR GET ALONG WITH OTHER PEOPLE: SOMEWHAT DIFFICULT
6. BECOMING EASILY ANNOYED OR IRRITABLE: NOT AT ALL
4. TROUBLE RELAXING: SEVERAL DAYS
2. NOT BEING ABLE TO STOP OR CONTROL WORRYING: SEVERAL DAYS
5. BEING SO RESTLESS THAT IT IS HARD TO SIT STILL: SEVERAL DAYS
7. FEELING AFRAID AS IF SOMETHING AWFUL MIGHT HAPPEN: NOT AT ALL
3. WORRYING TOO MUCH ABOUT DIFFERENT THINGS: NOT AT ALL
1. FEELING NERVOUS, ANXIOUS, OR ON EDGE: SEVERAL DAYS

## 2024-10-30 NOTE — PROGRESS NOTES
DAMIR Sweet comes in to establish care.  Chest pain: Patient has left-sided chest pain.  This has been noted on and off.  It is noted at rest and with activity.  He has been doing activity which includes pushing and pulling weights.  Denies diaphoresis.  Has occasional shortness of breath.  Pain does not radiate to the neck or left arm.  Did an EKG that was reassuring.  I will order labs.  Patient will be referred to the cardiologist for evaluation and management.  May need to get stress testing done.  Palpitations: Patient has palpitations.  These have been ongoing on and off.  He has presyncopal symptoms.  He has not passed out.  I will check labs.  Will check TSH, magnesium, electrolytes.  Patient will be referred to the cardiologist.  May need to get a Holter monitor placed.  Will follow-up with the results.  Presyncope: Patient has sensation of almost passing out.  This occurs mainly when he is changing position and getting up from laying down position.  I will order duplex ultrasound of his carotids.  I will check labs.  I will follow-up with the results.  Thyroid disorder: Will check TSH levels.  Hyperglycemia: I will check HbA1c.  Knee pain: Patient has chronic left knee pain.  He has had reconstruction of his ACL on 2 occasions.  Continues to have pain and discomfort.  He has hesitation and limitation in use of his left knee.  He is scheduled to see an orthopedist at [facility].  Will follow-up with the recommendations.  Iron deficiency: I will check iron level and TIBC.    Hypovitaminosis D: Will check vitamin D level.  IBS: Patient has a history of irritable bowel syndrome.  Patient has been seen by the gastroenterologist in the past.  He has abdominal discomfort and pain that comes on and off.  Denies blood in stools and the loose stool.  He will need to follow-up with the gastroenterologist.  Health maintenance: We will check hepatitis C antibody screening test.      Past Medical

## 2024-11-03 NOTE — PROGRESS NOTES
HISTORY OF PRESENT ILLNESS  Erika Sweet is a 34 y.o. male.    PMH  ----  CARDIAC STUDIES  ----  2d echo 1/27/2021   LEFT VENTRICULAR SYSTOLIC FUNCTION IS NORMAL WITH AN ESTIMATED EJECTION FRACTION OF 55%.    NO HEMODYNAMICALLY SIGNIFICANT VALVULAR PATHOLOGY.    NORMAL RIGHT VENTRICULAR SIZE AND FUNCTION.    NORMAL DIASTOLIC FUNCTION.    NO WALL ABNORMALITIES.    NO EVIDENCE OF PERICARDIAL EFFUSION.    NO OBVIOUS MASSES, SHUNTS OR THROMBI SEEN.    NO PREVIOUS REPORT FOR COMPARISON.   ----  Holter monitor 9/15/2017  IMPRESSION:     1. Sinus rhythm throughout.      2. Extremely rare supraventricular ectopy.      3. Normal 24 hour Holter.      4. No diary submitted.   ----    Have you had Fatigue?  Yes if so how long 2 months how bad mild  2.   Have you had have you had Chest Pain? Yes, states that dull discomfort in the left side of the chest, states that took knuckle and dinged, states that comes and goes, states that sometimes with stress sometimes with rest states for 4-5 years.  Denies personal or family history of blood clot  3.   Have you had Dyspnea (SOB) ? Yes if so how long  1 year  can you walk 2 blocks or a flight of stairs without SOB yes  4.   Have you had Orthopnea? No  5.   Have you had PND? No   6.   Have you had leg swelling? No   7.    Have you had any weight gain? No   8. Have you had any palpitations? Yes if so how long years how bad mild, states that father had an \"irregular heart beat\"  States that the palpitations sometimes feels consistently, sometimes can be a span of a couple hours, sometimes can feel one time for a day and sometimes multiple times.  States that when called Dr. Newell had felt it everyday for a week at least once.  States that takes one cup of coffee in the morning about 10 ounces, states that does take a soda every now and then, states that does take nicotine patches.     9. Have you had any syncope? No   10. Do you have any wounds on legs?No       Reviewed with the

## 2024-11-11 ENCOUNTER — HOSPITAL ENCOUNTER (OUTPATIENT)
Facility: HOSPITAL | Age: 34
Discharge: HOME | End: 2024-11-13
Payer: COMMERCIAL

## 2024-11-11 DIAGNOSIS — R55 PRE-SYNCOPE: ICD-10-CM

## 2024-11-11 LAB
VAS LEFT CCA DIST EDV: 26.8 CM/S
VAS LEFT CCA DIST PSV: 111.5 CM/S
VAS LEFT CCA MID EDV: 32.69 CM/S
VAS LEFT CCA MID PSV: 129.18 CM/S
VAS LEFT CCA PROX EDV: 28.8 CM/S
VAS LEFT CCA PROX PSV: 115.4 CM/S
VAS LEFT ECA EDV: 17.19 CM/S
VAS LEFT ECA PSV: 110.8 CM/S
VAS LEFT ICA DIST EDV: 21.5 CM/S
VAS LEFT ICA DIST PSV: 50.1 CM/S
VAS LEFT ICA MID EDV: 31.4 CM/S
VAS LEFT ICA MID PSV: 75.4 CM/S
VAS LEFT ICA PROX EDV: 24.8 CM/S
VAS LEFT ICA PROX PSV: 69.9 CM/S
VAS LEFT ICA/CCA PSV: 0.68 NO UNITS
VAS LEFT SUBCLAVIAN PROX EDV: 10.6 CM/S
VAS LEFT SUBCLAVIAN PROX PSV: 83.3 CM/S
VAS LEFT VERTEBRAL EDV: 16.8 CM/S
VAS LEFT VERTEBRAL PSV: 45.3 CM/S
VAS RIGHT CCA DIST EDV: 24.7 CM/S
VAS RIGHT CCA DIST PSV: 108.7 CM/S
VAS RIGHT CCA MID EDV: 23.1 CM/S
VAS RIGHT CCA MID PSV: 120.01 CM/S
VAS RIGHT CCA PROX EDV: 29.6 CM/S
VAS RIGHT CCA PROX PSV: 141 CM/S
VAS RIGHT ECA EDV: 9.66 CM/S
VAS RIGHT ECA PSV: 110.2 CM/S
VAS RIGHT ICA DIST EDV: 35.3 CM/S
VAS RIGHT ICA DIST PSV: 78.1 CM/S
VAS RIGHT ICA MID EDV: 16.6 CM/S
VAS RIGHT ICA MID PSV: 58.6 CM/S
VAS RIGHT ICA PROX EDV: 25.1 CM/S
VAS RIGHT ICA PROX PSV: 56.4 CM/S
VAS RIGHT ICA/CCA PSV: 0.7 NO UNITS
VAS RIGHT SUBCLAVIAN PROX EDV: 0 CM/S
VAS RIGHT SUBCLAVIAN PROX PSV: 145.1 CM/S
VAS RIGHT VERTEBRAL EDV: 20.77 CM/S
VAS RIGHT VERTEBRAL PSV: 58.2 CM/S

## 2024-11-11 PROCEDURE — 93880 EXTRACRANIAL BILAT STUDY: CPT

## 2024-11-11 PROCEDURE — 93880 EXTRACRANIAL BILAT STUDY: CPT | Performed by: INTERNAL MEDICINE

## 2024-11-13 ENCOUNTER — OFFICE VISIT (OUTPATIENT)
Age: 34
End: 2024-11-13
Payer: COMMERCIAL

## 2024-11-13 VITALS
DIASTOLIC BLOOD PRESSURE: 83 MMHG | HEIGHT: 73 IN | OXYGEN SATURATION: 98 % | HEART RATE: 97 BPM | SYSTOLIC BLOOD PRESSURE: 137 MMHG | WEIGHT: 172 LBS | BODY MASS INDEX: 22.8 KG/M2

## 2024-11-13 DIAGNOSIS — R07.2 PRECORDIAL PAIN: Primary | ICD-10-CM

## 2024-11-13 DIAGNOSIS — R00.2 PALPITATIONS: ICD-10-CM

## 2024-11-13 PROCEDURE — 99204 OFFICE O/P NEW MOD 45 MIN: CPT | Performed by: INTERNAL MEDICINE

## 2024-11-13 ASSESSMENT — ENCOUNTER SYMPTOMS
CONSTIPATION: 0
ABDOMINAL PAIN: 0
NAUSEA: 0
BLOOD IN STOOL: 0
CHEST TIGHTNESS: 1
DIARRHEA: 0
COUGH: 0
SHORTNESS OF BREATH: 1
COLOR CHANGE: 0
WHEEZING: 0
APNEA: 0

## 2024-11-13 ASSESSMENT — PATIENT HEALTH QUESTIONNAIRE - PHQ9
SUM OF ALL RESPONSES TO PHQ9 QUESTIONS 1 & 2: 0
1. LITTLE INTEREST OR PLEASURE IN DOING THINGS: NOT AT ALL
SUM OF ALL RESPONSES TO PHQ QUESTIONS 1-9: 0
2. FEELING DOWN, DEPRESSED OR HOPELESS: NOT AT ALL
DEPRESSION UNABLE TO ASSESS: FUNCTIONAL CAPACITY MOTIVATION LIMITS ACCURACY

## 2024-11-13 NOTE — PROGRESS NOTES
Have you had Fatigue?  Yes if so how long 2 months how bad mild  2.   Have you had have you had Chest Pain? Yes if so how long 2 years how frequent daily . Is   it  substernal? Yes Pressure like? No Comes on with Activity or with large meals? Is it   relieved by rest or NTG? No how bad No?   3.   Have you had Dyspnea (SOB) ? Yes if so how long  1 year  can you walk 2 blocks or a flight of stairs without SOB yes  4.   Have you had Orthopnea? No  5.   Have you had PND? No   6.   Have you had leg swelling? No   7.    Have you had any weight gain? No   8. Have you had any palpitations? Yes if so how long years how bad mild   9. Have you had any syncope? No   10. Do you have any wounds on legs?No

## 2024-11-13 NOTE — PATIENT INSTRUCTIONS
Learning About the Mediterranean Diet  What is the Mediterranean diet?     The Mediterranean diet is a style of eating rather than a diet plan. It features foods eaten in [place], [place], southern [place], and other countries along the [place] Sea. It emphasizes eating foods like fish, fruits, vegetables, beans, high-fiber breads and whole grains, nuts, and olive oil. This style of eating includes limited red meat, cheese, and sweets.  Why choose the Mediterranean diet?  A Mediterranean-style diet may improve heart health. It contains more fat than other heart-healthy diets. But the fats are mainly from nuts, unsaturated oils (such as fish oils and olive oil), and certain nut or seed oils (such as canola, soybean, or flaxseed oil). These fats may help protect the heart and blood vessels.  How can you get started on the Mediterranean diet?  Here are some things you can do to switch to a more Mediterranean way of eating.  What to eat  Eat a variety of fruits and vegetables each day, such as grapes, blueberries, tomatoes, broccoli, peppers, figs, olives, spinach, eggplant, beans, lentils, and chickpeas.  Eat a variety of whole-grain foods each day, such as oats, brown rice, and whole wheat bread, pasta, and couscous.  Eat fish at least 2 times a week. Try tuna, salmon, mackerel, lake trout, herring, or sardines.  Eat moderate amounts of low-fat dairy products, such as milk, cheese, or yogurt.  Eat moderate amounts of poultry and eggs.  Choose healthy (unsaturated) fats, such as nuts, olive oil, and certain nut or seed oils like canola, soybean, and flaxseed.  Limit unhealthy (saturated) fats, such as butter, palm oil, and coconut oil. And limit fats found in animal products, such as meat and dairy products made with whole milk. Try to eat red meat only a few times a month in very small amounts.  Limit sweets and desserts to only a few times a week. This includes sugar-sweetened drinks like soda.  The

## 2024-12-06 DIAGNOSIS — J06.9 UPPER RESPIRATORY TRACT INFECTION, UNSPECIFIED TYPE: ICD-10-CM

## 2024-12-06 DIAGNOSIS — J40 BRONCHITIS: Primary | ICD-10-CM

## 2024-12-06 RX ORDER — DOXYCYCLINE HYCLATE 100 MG
100 TABLET ORAL 2 TIMES DAILY
Qty: 10 TABLET | Refills: 0 | Status: SHIPPED | OUTPATIENT
Start: 2024-12-06 | End: 2024-12-11

## 2025-01-29 ENCOUNTER — OFFICE VISIT (OUTPATIENT)
Facility: CLINIC | Age: 35
End: 2025-01-29

## 2025-01-29 VITALS
HEART RATE: 100 BPM | TEMPERATURE: 97.8 F | RESPIRATION RATE: 18 BRPM | WEIGHT: 170 LBS | DIASTOLIC BLOOD PRESSURE: 71 MMHG | HEIGHT: 73 IN | OXYGEN SATURATION: 99 % | BODY MASS INDEX: 22.53 KG/M2 | SYSTOLIC BLOOD PRESSURE: 130 MMHG

## 2025-01-29 DIAGNOSIS — R06.2 WHEEZE: ICD-10-CM

## 2025-01-29 DIAGNOSIS — J40 BRONCHITIS: Primary | ICD-10-CM

## 2025-01-29 LAB
EXP DATE SOLUTION: NORMAL
EXP DATE SWAB: NORMAL
EXPIRATION DATE: NORMAL
INFLUENZA A ANTIGEN, POC: NEGATIVE
INFLUENZA B ANTIGEN, POC: NEGATIVE
LOT NUMBER POC: NORMAL
LOT NUMBER SOLUTION: NORMAL
LOT NUMBER SWAB: NORMAL
SARS-COV-2 RNA, POC: NEGATIVE
VALID INTERNAL CONTROL, POC: NORMAL

## 2025-01-29 RX ORDER — ALBUTEROL SULFATE 90 UG/1
2 INHALANT RESPIRATORY (INHALATION) 4 TIMES DAILY PRN
Qty: 18 G | Refills: 0 | Status: SHIPPED | OUTPATIENT
Start: 2025-01-29

## 2025-01-29 RX ORDER — LEVOFLOXACIN 500 MG/1
500 TABLET, FILM COATED ORAL DAILY
Qty: 7 TABLET | Refills: 0 | Status: SHIPPED | OUTPATIENT
Start: 2025-01-29 | End: 2025-02-05

## 2025-01-29 RX ORDER — DEXTROMETHORPHAN HYDROBROMIDE AND PROMETHAZINE HYDROCHLORIDE 15; 6.25 MG/5ML; MG/5ML
5 SYRUP ORAL 4 TIMES DAILY PRN
Qty: 240 ML | Refills: 0 | Status: SHIPPED | OUTPATIENT
Start: 2025-01-29

## 2025-01-29 RX ORDER — PREDNISONE 10 MG/1
10 TABLET ORAL 2 TIMES DAILY
Qty: 10 TABLET | Refills: 0 | Status: SHIPPED | OUTPATIENT
Start: 2025-01-29 | End: 2025-02-03

## 2025-01-29 SDOH — ECONOMIC STABILITY: FOOD INSECURITY: WITHIN THE PAST 12 MONTHS, YOU WORRIED THAT YOUR FOOD WOULD RUN OUT BEFORE YOU GOT MONEY TO BUY MORE.: NEVER TRUE

## 2025-01-29 SDOH — ECONOMIC STABILITY: FOOD INSECURITY: WITHIN THE PAST 12 MONTHS, THE FOOD YOU BOUGHT JUST DIDN'T LAST AND YOU DIDN'T HAVE MONEY TO GET MORE.: NEVER TRUE

## 2025-01-29 ASSESSMENT — PATIENT HEALTH QUESTIONNAIRE - PHQ9
2. FEELING DOWN, DEPRESSED OR HOPELESS: NOT AT ALL
SUM OF ALL RESPONSES TO PHQ QUESTIONS 1-9: 0
SUM OF ALL RESPONSES TO PHQ9 QUESTIONS 1 & 2: 0
1. LITTLE INTEREST OR PLEASURE IN DOING THINGS: NOT AT ALL
SUM OF ALL RESPONSES TO PHQ QUESTIONS 1-9: 0

## 2025-01-29 NOTE — PROGRESS NOTES
HPI  Richardsonrandy Sweet comes in for acute care.  Cough: Patient has a cough that has been ongoing for the past 3 weeks.  Cough is productive of phlegm.  Phlegm is occasionally dark and on 2 occasions has been blood-tinged.  He has chest tightness.  He has occasional wheeze.  Cough worse at night.  He had fever and chills.  He took 5 daily doxycycline dose and felt slightly improved.  Symptoms have however recurred.  He has malaise and fatigue.  Patient has occasional chest tightness.  I did a COVID-19 test which is negative.  Influenza A and B are also negative.  Suspect this is bronchitis.  I will order a chest x-ray.  I will give Levaquin to take daily for 1 week.  I will order Promethazine DM to take for symptomatic relief of the cough.  Patient has a wheeze.  I will give prednisone and albuterol inhaler.  I will follow-up at next visit.     Past Medical History  No past medical history on file.    Surgical History  Past Surgical History:   Procedure Laterality Date    KNEE ARTHROSCOPY W/ ACL RECONSTRUCTION Left 2022        Medications  No current outpatient medications on file.     No current facility-administered medications for this visit.       Allergies  No Known Allergies    Family History  Family History   Problem Relation Age of Onset    GERD Mother     Cancer Father     Heart Failure Maternal Grandfather        Social History  Social History     Socioeconomic History    Marital status:      Spouse name: Not on file    Number of children: Not on file    Years of education: Not on file    Highest education level: Not on file   Occupational History    Not on file   Tobacco Use    Smoking status: Never    Smokeless tobacco: Never   Vaping Use    Vaping status: Never Used   Substance and Sexual Activity    Alcohol use: Yes     Comment: occasionally    Drug use: Never    Sexual activity: Yes     Partners: Female   Other Topics Concern    Not on file   Social History Narrative    Not on file     Social

## 2025-03-12 LAB
BASOPHILS # BLD: 1 % (ref 0–2)
BASOPHILS ABSOLUTE: 0 K/UL (ref 0–0.2)
EOSINOPHIL # BLD: 2 % (ref 0–6)
EOSINOPHILS ABSOLUTE: 0.1 K/UL (ref 0–0.5)
ESTIMATED AVERAGE GLUCOSE: 100 MG/DL (ref 91–123)
HBA1C MFR BLD: 5.1 % (ref 4.8–5.6)
HCT VFR BLD CALC: 49.2 % (ref 36.6–51.9)
HEMOGLOBIN: 15.4 G/DL (ref 13.2–17.3)
LYMPHOCYTES # BLD: 36 % (ref 20–45)
LYMPHOCYTES ABSOLUTE: 1.9 K/UL (ref 1–4.8)
MCH RBC QN AUTO: 30 PG (ref 26–34)
MCHC RBC AUTO-ENTMCNC: 31 G/DL (ref 31–36)
MCV RBC AUTO: 97 FL (ref 80–95)
MONOCYTES ABSOLUTE: 0.4 K/UL (ref 0.1–1)
MONOCYTES: 8 % (ref 3–12)
NEUTROPHILS ABSOLUTE: 2.8 K/UL (ref 1.8–7.7)
NEUTROPHILS SEGMENTED: 53 % (ref 40–75)
PDW BLD-RTO: 12.5 % (ref 10–15.5)
PLATELET # BLD: 256 K/UL (ref 140–440)
PMV BLD AUTO: 10.7 FL (ref 9–13)
RBC # BLD: 5.1 M/UL (ref 3.8–5.8)
WBC # BLD: 5.3 K/UL (ref 4–11)

## 2025-03-13 LAB
A/G RATIO: 2.3 RATIO (ref 1.1–2.6)
ALBUMIN: 4.9 G/DL (ref 3.5–5)
ALP BLD-CCNC: 79 U/L (ref 25–115)
ALT SERPL-CCNC: 21 U/L (ref 5–40)
ANION GAP SERPL CALCULATED.3IONS-SCNC: 19 MMOL/L (ref 3–15)
AST SERPL-CCNC: 16 U/L (ref 10–37)
BILIRUB SERPL-MCNC: 0.8 MG/DL (ref 0.2–1.2)
BUN BLDV-MCNC: 12 MG/DL (ref 6–22)
CALCIUM SERPL-MCNC: 9.9 MG/DL (ref 8.4–10.5)
CHLORIDE BLD-SCNC: 100 MMOL/L (ref 98–110)
CHOLESTEROL, TOTAL: 236 MG/DL (ref 110–200)
CHOLESTEROL/HDL RATIO: 4.8 (ref 0–5)
CO2: 19 MMOL/L (ref 20–32)
CREAT SERPL-MCNC: 1 MG/DL (ref 0.5–1.2)
GFR, ESTIMATED: >60 ML/MIN/1.73 SQ.M.
GLOBULIN: 2.1 G/DL (ref 2–4)
GLUCOSE: 73 MG/DL (ref 70–99)
HDLC SERPL-MCNC: 49 MG/DL
HEPATITIS C ANTIBODY: NORMAL
IRON % SATURATION: 50 % (ref 20–50)
IRON: 173 MCG/DL (ref 45–160)
LDL CHOLESTEROL: 170 MG/DL (ref 50–99)
LDL/HDL RATIO: 3.5
MAGNESIUM: 2.7 MG/DL (ref 1.6–2.5)
NON-HDL CHOLESTEROL: 187 MG/DL
POTASSIUM SERPL-SCNC: 4.6 MMOL/L (ref 3.5–5.5)
SODIUM BLD-SCNC: 138 MMOL/L (ref 133–145)
T4 FREE: 1.3 NG/DL (ref 0.9–1.8)
TOTAL IRON BINDING CAPACITY: 343 MCG/DL (ref 228–428)
TOTAL PROTEIN: 7 G/DL (ref 6.4–8.3)
TRIGL SERPL-MCNC: 83 MG/DL (ref 40–149)
TSH SERPL DL<=0.05 MIU/L-ACNC: 1.65 MCU/ML (ref 0.27–4.2)
UNSATURATED IRON BINDING CAPACITY: 170 MCG/DL (ref 110–370)
VITAMIN D 25-HYDROXY: 20.9 NG/ML (ref 32–100)
VLDLC SERPL CALC-MCNC: 17 MG/DL (ref 8–30)

## 2025-03-18 DIAGNOSIS — E83.41 HYPERMAGNESEMIA: Primary | ICD-10-CM

## 2025-03-18 DIAGNOSIS — E83.41 HYPERMAGNESEMIA: ICD-10-CM

## 2025-03-18 DIAGNOSIS — E83.19 IRON EXCESS: ICD-10-CM

## 2025-03-24 ENCOUNTER — OFFICE VISIT (OUTPATIENT)
Facility: CLINIC | Age: 35
End: 2025-03-24

## 2025-03-24 DIAGNOSIS — R53.81 MALAISE AND FATIGUE: ICD-10-CM

## 2025-03-24 DIAGNOSIS — E55.9 HYPOVITAMINOSIS D: ICD-10-CM

## 2025-03-24 DIAGNOSIS — R00.2 PALPITATIONS: Primary | ICD-10-CM

## 2025-03-24 DIAGNOSIS — E83.40 MAGNESIUM DISORDER: ICD-10-CM

## 2025-03-24 DIAGNOSIS — R53.83 MALAISE AND FATIGUE: ICD-10-CM

## 2025-03-24 NOTE — PROGRESS NOTES
\"Have you been to the ER, urgent care clinic since your last visit?  Hospitalized since your last visit?\"    NO    “Have you seen or consulted any other health care providers outside our system since your last visit?”    NO               EKG ordered performed and reviewed by provider without issue incident or complaint

## 2025-03-24 NOTE — PATIENT INSTRUCTIONS
Patient with palpitations, SOB, history of hypermagnesemia, extreme fatigue. Needs evaluation and referred to ED. Would benefit from labs to check electrolytes and d dimer test. EKG done in clinic has PACs.

## 2025-03-25 NOTE — PROGRESS NOTES
HPI  Erika Sweet is seen for acute care.  Palpitations: Patient has been having episodes of palpitations.  Patient also has shortness of breath.  This has been ongoing for days.  He has been extremely weak and with malaise and fatigue.  No syncope, chest pain, wheeze, fever or chills.  Denies nasal congestion sneezing.  Denies abdominal pain, abdominal distention, nausea, vomiting or diarrhea.  Patient previously had lab work done that showed elevated magnesium.  Patient also had thyroid test done that was within normal.  Patient has been seen by the cardiologist in the recently started wearing Holter monitor.  Did an EKG that shows PACs.  Given the PACs and the extreme fatigue and shortness of breath patient was referred to the emergency room for evaluation and management.  Will need to get lab work done with the possible D-dimer test to evaluate for PE.  His O2 sats however here in the clinic have been stable.  Hypovitaminosis D: Patient has low vitamin D levels.  He has been prescribed vitamin D to take 50,000 units weekly.      Past Medical History  History reviewed. No pertinent past medical history.    Surgical History  Past Surgical History:   Procedure Laterality Date    KNEE ARTHROSCOPY W/ ACL RECONSTRUCTION Left 2022        Medications  Current Outpatient Medications   Medication Sig Dispense Refill    promethazine-dextromethorphan (PROMETHAZINE-DM) 6.25-15 MG/5ML syrup Take 5 mLs by mouth 4 times daily as needed for Cough 240 mL 0    albuterol sulfate HFA (VENTOLIN HFA) 108 (90 Base) MCG/ACT inhaler Inhale 2 puffs into the lungs 4 times daily as needed for Wheezing 18 g 0    vitamin D (CHOLECALCIFEROL) 04240 UNIT CAPS Take 1 capsule by mouth once a week (Patient not taking: Reported on 3/24/2025) 13 capsule 3     No current facility-administered medications for this visit.       Allergies  No Known Allergies    Family History  Family History   Problem Relation Age of Onset    GERD Mother     Cancer

## 2025-07-29 ENCOUNTER — OFFICE VISIT (OUTPATIENT)
Age: 35
End: 2025-07-29
Payer: COMMERCIAL

## 2025-07-29 VITALS
HEART RATE: 95 BPM | SYSTOLIC BLOOD PRESSURE: 131 MMHG | OXYGEN SATURATION: 98 % | BODY MASS INDEX: 23.09 KG/M2 | DIASTOLIC BLOOD PRESSURE: 83 MMHG | WEIGHT: 175 LBS

## 2025-07-29 DIAGNOSIS — R07.2 PRECORDIAL PAIN: ICD-10-CM

## 2025-07-29 DIAGNOSIS — R00.2 PALPITATIONS: Primary | ICD-10-CM

## 2025-07-29 PROCEDURE — 99213 OFFICE O/P EST LOW 20 MIN: CPT | Performed by: NURSE PRACTITIONER
